# Patient Record
Sex: MALE | Race: WHITE | NOT HISPANIC OR LATINO
[De-identification: names, ages, dates, MRNs, and addresses within clinical notes are randomized per-mention and may not be internally consistent; named-entity substitution may affect disease eponyms.]

---

## 2018-05-01 ENCOUNTER — RECORDS - HEALTHEAST (OUTPATIENT)
Dept: ADMINISTRATIVE | Facility: OTHER | Age: 68
End: 2018-05-01

## 2018-05-07 ENCOUNTER — RECORDS - HEALTHEAST (OUTPATIENT)
Dept: ADMINISTRATIVE | Facility: OTHER | Age: 68
End: 2018-05-07

## 2018-05-09 ASSESSMENT — MIFFLIN-ST. JEOR: SCORE: 1826.74

## 2018-05-16 ASSESSMENT — MIFFLIN-ST. JEOR: SCORE: 1799.52

## 2018-05-18 ENCOUNTER — ANESTHESIA - HEALTHEAST (OUTPATIENT)
Dept: SURGERY | Facility: CLINIC | Age: 68
End: 2018-05-18

## 2018-05-21 ENCOUNTER — SURGERY - HEALTHEAST (OUTPATIENT)
Dept: SURGERY | Facility: CLINIC | Age: 68
End: 2018-05-21

## 2018-05-21 ASSESSMENT — MIFFLIN-ST. JEOR: SCORE: 1800.09

## 2019-01-01 ENCOUNTER — RECORDS - HEALTHEAST (OUTPATIENT)
Dept: ADMINISTRATIVE | Facility: OTHER | Age: 69
End: 2019-01-01

## 2019-09-27 ENCOUNTER — RECORDS - HEALTHEAST (OUTPATIENT)
Dept: ADMINISTRATIVE | Facility: OTHER | Age: 69
End: 2019-09-27

## 2019-09-30 ENCOUNTER — RECORDS - HEALTHEAST (OUTPATIENT)
Dept: ADMINISTRATIVE | Facility: OTHER | Age: 69
End: 2019-09-30

## 2019-10-03 ENCOUNTER — RECORDS - HEALTHEAST (OUTPATIENT)
Dept: ADMINISTRATIVE | Facility: OTHER | Age: 69
End: 2019-10-03

## 2020-01-01 ENCOUNTER — COMMUNICATION - HEALTHEAST (OUTPATIENT)
Dept: SCHEDULING | Facility: CLINIC | Age: 70
End: 2020-01-01

## 2020-01-01 ENCOUNTER — INFUSION - HEALTHEAST (OUTPATIENT)
Dept: INFUSION THERAPY | Facility: HOSPITAL | Age: 70
End: 2020-01-01

## 2020-01-01 ENCOUNTER — HOSPITAL ENCOUNTER (OUTPATIENT)
Dept: CT IMAGING | Facility: HOSPITAL | Age: 70
Setting detail: RADIATION/ONCOLOGY SERIES
Discharge: STILL ADMITTED OR ADMINISTRATIVE | End: 2020-09-30
Attending: INTERNAL MEDICINE

## 2020-01-01 ENCOUNTER — RECORDS - HEALTHEAST (OUTPATIENT)
Dept: ADMINISTRATIVE | Facility: OTHER | Age: 70
End: 2020-01-01

## 2020-01-01 ENCOUNTER — SURGERY - HEALTHEAST (OUTPATIENT)
Dept: SURGERY | Facility: HOSPITAL | Age: 70
End: 2020-01-01

## 2020-01-01 ENCOUNTER — COMMUNICATION - HEALTHEAST (OUTPATIENT)
Dept: ADMINISTRATIVE | Facility: HOSPITAL | Age: 70
End: 2020-01-01

## 2020-01-01 ENCOUNTER — COMMUNICATION - HEALTHEAST (OUTPATIENT)
Dept: ONCOLOGY | Facility: HOSPITAL | Age: 70
End: 2020-01-01

## 2020-01-01 ENCOUNTER — RECORDS - HEALTHEAST (OUTPATIENT)
Dept: RADIOLOGY | Facility: CLINIC | Age: 70
End: 2020-01-01

## 2020-01-01 ENCOUNTER — AMBULATORY - HEALTHEAST (OUTPATIENT)
Dept: FAMILY MEDICINE | Facility: CLINIC | Age: 70
End: 2020-01-01

## 2020-01-01 ENCOUNTER — AMBULATORY - HEALTHEAST (OUTPATIENT)
Dept: INTERVENTIONAL RADIOLOGY/VASCULAR | Facility: HOSPITAL | Age: 70
End: 2020-01-01

## 2020-01-01 ENCOUNTER — ANESTHESIA - HEALTHEAST (OUTPATIENT)
Dept: SURGERY | Facility: HOSPITAL | Age: 70
End: 2020-01-01

## 2020-01-01 ENCOUNTER — AMBULATORY - HEALTHEAST (OUTPATIENT)
Dept: ULTRASOUND IMAGING | Facility: HOSPITAL | Age: 70
End: 2020-01-01

## 2020-01-01 ENCOUNTER — AMBULATORY - HEALTHEAST (OUTPATIENT)
Dept: SCHEDULING | Facility: CLINIC | Age: 70
End: 2020-01-01

## 2020-01-01 ENCOUNTER — OFFICE VISIT - HEALTHEAST (OUTPATIENT)
Dept: ONCOLOGY | Facility: HOSPITAL | Age: 70
End: 2020-01-01

## 2020-01-01 ENCOUNTER — HOSPITAL ENCOUNTER (OUTPATIENT)
Dept: ULTRASOUND IMAGING | Facility: HOSPITAL | Age: 70
Discharge: HOME | End: 2020-09-15
Attending: INTERNAL MEDICINE | Admitting: RADIOLOGY

## 2020-01-01 ENCOUNTER — AMBULATORY - HEALTHEAST (OUTPATIENT)
Dept: INFUSION THERAPY | Facility: HOSPITAL | Age: 70
End: 2020-01-01

## 2020-01-01 ENCOUNTER — HOSPITAL ENCOUNTER (OUTPATIENT)
Dept: INTERVENTIONAL RADIOLOGY/VASCULAR | Facility: HOSPITAL | Age: 70
Setting detail: RADIATION/ONCOLOGY SERIES
Discharge: STILL ADMITTED OR ADMINISTRATIVE | End: 2020-09-14
Attending: INTERNAL MEDICINE

## 2020-01-01 ENCOUNTER — RECORDS - HEALTHEAST (OUTPATIENT)
Dept: HEALTH INFORMATION MANAGEMENT | Facility: CLINIC | Age: 70
End: 2020-01-01

## 2020-01-01 ENCOUNTER — HOSPITAL ENCOUNTER (OUTPATIENT)
Dept: ULTRASOUND IMAGING | Facility: HOSPITAL | Age: 70
Discharge: HOME | End: 2020-09-01
Attending: INTERNAL MEDICINE | Admitting: RADIOLOGY

## 2020-01-01 ENCOUNTER — AMBULATORY - HEALTHEAST (OUTPATIENT)
Dept: ULTRASOUND IMAGING | Facility: CLINIC | Age: 70
End: 2020-01-01

## 2020-01-01 ENCOUNTER — HOSPITAL ENCOUNTER (OUTPATIENT)
Dept: ULTRASOUND IMAGING | Facility: CLINIC | Age: 70
Discharge: HOME | End: 2020-08-14
Attending: INTERNAL MEDICINE | Admitting: RADIOLOGY

## 2020-01-01 DIAGNOSIS — C19 COLORECTAL CANCER (H): ICD-10-CM

## 2020-01-01 DIAGNOSIS — E86.0 DEHYDRATION: ICD-10-CM

## 2020-01-01 DIAGNOSIS — C18.7 CANCER OF SIGMOID COLON (H): ICD-10-CM

## 2020-01-01 DIAGNOSIS — Z51.11 ENCOUNTER FOR ANTINEOPLASTIC CHEMOTHERAPY: ICD-10-CM

## 2020-01-01 DIAGNOSIS — Z11.59 ENCOUNTER FOR SCREENING FOR OTHER VIRAL DISEASES: ICD-10-CM

## 2020-01-01 DIAGNOSIS — Z96.641 STATUS POST RIGHT HIP REPLACEMENT: ICD-10-CM

## 2020-01-01 DIAGNOSIS — R06.02 SHORTNESS OF BREATH: ICD-10-CM

## 2020-01-01 DIAGNOSIS — R11.2 CHEMOTHERAPY INDUCED NAUSEA AND VOMITING: ICD-10-CM

## 2020-01-01 DIAGNOSIS — E87.6 HYPOKALEMIA: ICD-10-CM

## 2020-01-01 DIAGNOSIS — T45.1X5A CHEMOTHERAPY INDUCED NAUSEA AND VOMITING: ICD-10-CM

## 2020-01-01 DIAGNOSIS — R18.0 MALIGNANT ASCITES (H): ICD-10-CM

## 2020-01-01 LAB
ALBUMIN SERPL-MCNC: 2.5 G/DL (ref 3.5–5)
ALBUMIN SERPL-MCNC: 2.6 G/DL (ref 3.5–5)
ALBUMIN SERPL-MCNC: 2.6 G/DL (ref 3.5–5)
ALBUMIN UR-MCNC: ABNORMAL MG/DL
ALBUMIN UR-MCNC: ABNORMAL MG/DL
ALP SERPL-CCNC: 51 U/L (ref 45–120)
ALP SERPL-CCNC: 82 U/L (ref 45–120)
ALP SERPL-CCNC: 82 U/L (ref 45–120)
ALT SERPL W P-5'-P-CCNC: 16 U/L (ref 0–45)
ALT SERPL W P-5'-P-CCNC: 21 U/L (ref 0–45)
ALT SERPL W P-5'-P-CCNC: 24 U/L (ref 0–45)
ALT SERPL W/O P-5'-P-CCNC: 13 U/L (ref 9–46)
ANION GAP SERPL CALCULATED.3IONS-SCNC: 10 MMOL/L (ref 5–18)
ANION GAP SERPL CALCULATED.3IONS-SCNC: 12 MMOL/L (ref 5–18)
ANION GAP SERPL CALCULATED.3IONS-SCNC: 16 MMOL/L (ref 5–18)
AST SERPL W P-5'-P-CCNC: 22 U/L (ref 0–40)
AST SERPL W P-5'-P-CCNC: 23 U/L (ref 0–40)
AST SERPL W P-5'-P-CCNC: 30 U/L (ref 0–40)
AST SERPL-CCNC: 28 U/L (ref 10–35)
BASOPHILS # BLD AUTO: 0 THOU/UL (ref 0–0.2)
BASOPHILS # BLD AUTO: 0 THOU/UL (ref 0–0.2)
BASOPHILS # BLD AUTO: 0.1 THOU/UL (ref 0–0.2)
BASOPHILS NFR BLD AUTO: 0 % (ref 0–2)
BASOPHILS NFR BLD AUTO: 1 % (ref 0–2)
BASOPHILS NFR BLD AUTO: 1 % (ref 0–2)
BILIRUB SERPL-MCNC: 0.3 MG/DL (ref 0–1)
BILIRUB SERPL-MCNC: 0.6 MG/DL (ref 0–1)
BILIRUB SERPL-MCNC: 0.6 MG/DL (ref 0–1)
BUN SERPL-MCNC: 22 MG/DL (ref 8–28)
BUN SERPL-MCNC: 24 MG/DL (ref 8–28)
BUN SERPL-MCNC: 29 MG/DL (ref 8–28)
CALCIUM SERPL-MCNC: 8.4 MG/DL (ref 8.5–10.5)
CALCIUM SERPL-MCNC: 8.8 MG/DL (ref 8.5–10.5)
CALCIUM SERPL-MCNC: 8.9 MG/DL (ref 8.5–10.5)
CAP COMMENT: ABNORMAL
CAP COMMENT: NORMAL
CEA SERPL-MCNC: 1157.4 NG/ML (ref 0–3)
CHLORIDE BLD-SCNC: 100 MMOL/L (ref 98–107)
CHLORIDE BLD-SCNC: 93 MMOL/L (ref 98–107)
CHLORIDE BLD-SCNC: 94 MMOL/L (ref 98–107)
CHOLEST SERPL-MCNC: 156 MG/DL
CO2 SERPL-SCNC: 18 MMOL/L (ref 22–31)
CO2 SERPL-SCNC: 20 MMOL/L (ref 22–31)
CO2 SERPL-SCNC: 29 MMOL/L (ref 22–31)
CREAT SERPL-MCNC: 0.95 MG/DL (ref 0.7–1.3)
CREAT SERPL-MCNC: 1.3 MG/DL (ref 0.7–1.3)
CREAT SERPL-MCNC: 1.43 MG/DL (ref 0.7–1.15)
CREAT SERPL-MCNC: 1.47 MG/DL (ref 0.7–1.3)
DOHLE BODIES: ABNORMAL
EOSINOPHIL # BLD AUTO: 0 THOU/UL (ref 0–0.4)
EOSINOPHIL # BLD AUTO: 0.1 THOU/UL (ref 0–0.4)
EOSINOPHIL COUNT (ABSOLUTE): 0 THOU/UL (ref 0–0.4)
EOSINOPHIL NFR BLD AUTO: 0 % (ref 0–6)
EOSINOPHIL NFR BLD AUTO: 0 % (ref 0–6)
EOSINOPHIL NFR BLD AUTO: 2 % (ref 0–6)
ERYTHROCYTE [DISTWIDTH] IN BLOOD BY AUTOMATED COUNT: 15.2 % (ref 11–14.5)
ERYTHROCYTE [DISTWIDTH] IN BLOOD BY AUTOMATED COUNT: 15.7 % (ref 11–14.5)
ERYTHROCYTE [DISTWIDTH] IN BLOOD BY AUTOMATED COUNT: 15.8 % (ref 11–14.5)
GENETICIST REVIEW: NORMAL
GFR ESTIMATE EXT - HISTORICAL: 49 ML/MIN/1.73M2
GFR ESTIMATE, IF BLACK EXT - HISTORICAL: 56 ML/MIN/1.73M2
GFR SERPL CREATININE-BSD FRML MDRD: 47 ML/MIN/1.73M2
GFR SERPL CREATININE-BSD FRML MDRD: 54 ML/MIN/1.73M2
GFR SERPL CREATININE-BSD FRML MDRD: >60 ML/MIN/1.73M2
GLUCOSE BLD-MCNC: 101 MG/DL (ref 70–125)
GLUCOSE BLD-MCNC: 105 MG/DL (ref 70–125)
GLUCOSE BLD-MCNC: 110 MG/DL (ref 70–125)
HCT VFR BLD AUTO: 37.9 % (ref 40–54)
HCT VFR BLD AUTO: 39.6 % (ref 40–54)
HCT VFR BLD AUTO: 42 % (ref 40–54)
HDLC SERPL-MCNC: 29 MG/DL
HGB BLD-MCNC: 12.9 G/DL (ref 14–18)
HGB BLD-MCNC: 13.2 G/DL (ref 14–18)
HGB BLD-MCNC: 14.5 G/DL (ref 14–18)
IMM GRANULOCYTES # BLD: 0 THOU/UL
IMM GRANULOCYTES # BLD: 0.1 THOU/UL
IMM GRANULOCYTES NFR BLD: 1 %
IMM GRANULOCYTES NFR BLD: 1 %
IMMATURE GRANULOCYTE % - MAN (DIFF): 2 %
IMMATURE GRANULOCYTE ABSOLUTE - MAN (DIFF): 0.2 THOU/UL
LAB AP CHARGES (HE HISTORICAL CONVERSION): ABNORMAL
LAB AP CHARGES (HE HISTORICAL CONVERSION): NORMAL
LAB MED GENERAL PATH INTERP (HE HISTORICAL CONVERSION): ABNORMAL
LAB MED GENERAL PATH INTERP (HE HISTORICAL CONVERSION): NORMAL
LDLC SERPL CALC-MCNC: 107 MG/DL
LYMPHOCYTES # BLD AUTO: 1 THOU/UL (ref 0.8–4.4)
LYMPHOCYTES # BLD AUTO: 1.5 THOU/UL (ref 0.8–4.4)
LYMPHOCYTES # BLD AUTO: 1.5 THOU/UL (ref 0.8–4.4)
LYMPHOCYTES NFR BLD AUTO: 15 % (ref 20–40)
LYMPHOCYTES NFR BLD AUTO: 17 % (ref 20–40)
LYMPHOCYTES NFR BLD AUTO: 17 % (ref 20–40)
MAGNESIUM SERPL-MCNC: 1.7 MG/DL (ref 1.8–2.6)
MCH RBC QN AUTO: 27.8 PG (ref 27–34)
MCH RBC QN AUTO: 27.9 PG (ref 27–34)
MCH RBC QN AUTO: 28 PG (ref 27–34)
MCHC RBC AUTO-ENTMCNC: 33.3 G/DL (ref 32–36)
MCHC RBC AUTO-ENTMCNC: 34 G/DL (ref 32–36)
MCHC RBC AUTO-ENTMCNC: 34.5 G/DL (ref 32–36)
MCV RBC AUTO: 81 FL (ref 80–100)
MCV RBC AUTO: 82 FL (ref 80–100)
MCV RBC AUTO: 84 FL (ref 80–100)
METAMYELOCYTES (ABSOLUTE): 0.2 THOU/UL
METAMYELOCYTES NFR BLD MANUAL: 2 %
MONOCYTES # BLD AUTO: 0.5 THOU/UL (ref 0–0.9)
MONOCYTES # BLD AUTO: 0.6 THOU/UL (ref 0–0.9)
MONOCYTES # BLD AUTO: 0.6 THOU/UL (ref 0–0.9)
MONOCYTES NFR BLD AUTO: 5 % (ref 2–10)
MONOCYTES NFR BLD AUTO: 7 % (ref 2–10)
MONOCYTES NFR BLD AUTO: 9 % (ref 2–10)
NEUTROPHILS # BLD AUTO: 4.9 THOU/UL (ref 2–7.7)
NEUTROPHILS # BLD AUTO: 6.4 THOU/UL (ref 2–7.7)
NEUTROPHILS NFR BLD AUTO: 74 % (ref 50–70)
NEUTROPHILS NFR BLD AUTO: 76 % (ref 50–70)
NON HDL CHOL. (LDL+VLDL): 127 MG/DL
OVALOCYTES: ABNORMAL
OVALOCYTES: ABNORMAL
PATH REPORT.COMMENTS IMP SPEC: ABNORMAL
PATH REPORT.COMMENTS IMP SPEC: ABNORMAL
PATH REPORT.COMMENTS IMP SPEC: NORMAL
PATH REPORT.FINAL DX SPEC: ABNORMAL
PATH REPORT.FINAL DX SPEC: NORMAL
PATH REPORT.MICROSCOPIC SPEC OTHER STN: ABNORMAL
PATH REPORT.MICROSCOPIC SPEC OTHER STN: NORMAL
PATH REPORT.RELEVANT HX SPEC: ABNORMAL
PATH REPORT.RELEVANT HX SPEC: NORMAL
PLAT MORPH BLD: NORMAL
PLAT MORPH BLD: NORMAL
PLATELET # BLD AUTO: 300 THOU/UL (ref 140–440)
PLATELET # BLD AUTO: 309 THOU/UL (ref 140–440)
PLATELET # BLD AUTO: 354 THOU/UL (ref 140–440)
PMV BLD AUTO: 10.1 FL (ref 8.5–12.5)
PMV BLD AUTO: 10.2 FL (ref 8.5–12.5)
PMV BLD AUTO: 9.5 FL (ref 8.5–12.5)
POLYCHROMASIA BLD QL SMEAR: ABNORMAL
POTASSIUM BLD-SCNC: 3.1 MMOL/L (ref 3.5–5)
POTASSIUM BLD-SCNC: 3.5 MMOL/L (ref 3.5–5)
POTASSIUM BLD-SCNC: 4.2 MMOL/L (ref 3.5–5)
PROT SERPL-MCNC: 6.1 G/DL (ref 6–8)
PROT SERPL-MCNC: 6.2 G/DL (ref 6–8)
PROT SERPL-MCNC: 6.3 G/DL (ref 6–8)
PROVIDER SIGNING NAME: NORMAL
RBC # BLD AUTO: 4.64 MILL/UL (ref 4.4–6.2)
RBC # BLD AUTO: 4.71 MILL/UL (ref 4.4–6.2)
RBC # BLD AUTO: 5.19 MILL/UL (ref 4.4–6.2)
REACTIVE LYMPHS: ABNORMAL
REF LAB TEST METHOD: NORMAL
SARS-COV-2 PCR COMMENT: NORMAL
SARS-COV-2 RNA SPEC QL NAA+PROBE: NEGATIVE
SARS-COV-2 VIRUS SPECIMEN SOURCE: NORMAL
SODIUM SERPL-SCNC: 128 MMOL/L (ref 136–145)
SODIUM SERPL-SCNC: 130 MMOL/L (ref 136–145)
SODIUM SERPL-SCNC: 134 MMOL/L (ref 136–145)
SPECIMEN DESCRIPTION: ABNORMAL
SPECIMEN DESCRIPTION: NORMAL
TEST PERFORMANCE INFO SPEC: NORMAL
TOTAL NEUTROPHILS-ABS(DIFF): 6.3 THOU/UL (ref 2–7.7)
TOTAL NEUTROPHILS-REL(DIFF): 73 % (ref 50–70)
TOXIC GRANULATION: ABNORMAL
TRIGLYCERIDES (HISTORICAL CONVERSION): 100 MG/DL
UGT1A1 ALLELE GENO BLD/T: NORMAL
UGT1A1 GENE PROD MET ACT IMP BLD/T-IMP: NORMAL
WBC: 6.6 THOU/UL (ref 4–11)
WBC: 8.5 THOU/UL (ref 4–11)
WBC: 8.6 THOU/UL (ref 4–11)

## 2020-01-01 RX ORDER — PROCHLORPERAZINE MALEATE 10 MG
10 TABLET ORAL EVERY 6 HOURS PRN
Qty: 30 TABLET | Refills: 1 | Status: SHIPPED | OUTPATIENT
Start: 2020-01-01

## 2020-01-01 RX ORDER — LOPERAMIDE HCL 2 MG
CAPSULE ORAL
Qty: 30 CAPSULE | Refills: 0 | Status: SHIPPED | OUTPATIENT
Start: 2020-01-01

## 2020-01-01 RX ORDER — ONDANSETRON 8 MG/1
8 TABLET, ORALLY DISINTEGRATING ORAL EVERY 6 HOURS PRN
Qty: 30 TABLET | Refills: 1 | Status: SHIPPED | OUTPATIENT
Start: 2020-01-01

## 2020-01-01 RX ORDER — LANOLIN ALCOHOL/MO/W.PET/CERES
3 CREAM (GRAM) TOPICAL
Status: SHIPPED | COMMUNITY
Start: 2020-01-01

## 2020-01-01 RX ORDER — LIDOCAINE/PRILOCAINE 2.5 %-2.5%
CREAM (GRAM) TOPICAL
Qty: 15 G | Refills: 1 | Status: SHIPPED | OUTPATIENT
Start: 2020-01-01

## 2020-01-01 ASSESSMENT — MIFFLIN-ST. JEOR
SCORE: 1615.38
SCORE: 1670.25
SCORE: 1651.38
SCORE: 1669.8

## 2020-10-22 ENCOUNTER — RECORDS - HEALTHEAST (OUTPATIENT)
Dept: ADMINISTRATIVE | Facility: OTHER | Age: 70
End: 2020-10-22

## 2021-05-26 VITALS — HEART RATE: 100 BPM | OXYGEN SATURATION: 95 % | SYSTOLIC BLOOD PRESSURE: 136 MMHG | DIASTOLIC BLOOD PRESSURE: 78 MMHG

## 2021-05-26 VITALS — OXYGEN SATURATION: 98 % | SYSTOLIC BLOOD PRESSURE: 124 MMHG | DIASTOLIC BLOOD PRESSURE: 70 MMHG | HEART RATE: 78 BPM

## 2021-05-26 VITALS
DIASTOLIC BLOOD PRESSURE: 78 MMHG | OXYGEN SATURATION: 99 % | HEART RATE: 86 BPM | TEMPERATURE: 97.6 F | SYSTOLIC BLOOD PRESSURE: 123 MMHG

## 2021-05-26 VITALS
SYSTOLIC BLOOD PRESSURE: 144 MMHG | TEMPERATURE: 97.7 F | DIASTOLIC BLOOD PRESSURE: 87 MMHG | OXYGEN SATURATION: 96 % | HEART RATE: 71 BPM

## 2021-05-26 VITALS — SYSTOLIC BLOOD PRESSURE: 121 MMHG | TEMPERATURE: 97.4 F | DIASTOLIC BLOOD PRESSURE: 65 MMHG | HEART RATE: 88 BPM

## 2021-05-27 VITALS
TEMPERATURE: 97.5 F | HEART RATE: 101 BPM | DIASTOLIC BLOOD PRESSURE: 65 MMHG | OXYGEN SATURATION: 97 % | SYSTOLIC BLOOD PRESSURE: 101 MMHG

## 2021-06-01 VITALS — BODY MASS INDEX: 32.95 KG/M2 | WEIGHT: 230.13 LBS | HEIGHT: 70 IN

## 2021-06-05 VITALS — BODY MASS INDEX: 30.36 KG/M2 | WEIGHT: 205 LBS | HEIGHT: 69 IN

## 2021-06-05 VITALS
DIASTOLIC BLOOD PRESSURE: 80 MMHG | TEMPERATURE: 98 F | SYSTOLIC BLOOD PRESSURE: 125 MMHG | OXYGEN SATURATION: 96 % | WEIGHT: 204.9 LBS | HEIGHT: 69 IN | HEART RATE: 92 BPM | BODY MASS INDEX: 30.35 KG/M2

## 2021-06-05 VITALS
BODY MASS INDEX: 27.3 KG/M2 | DIASTOLIC BLOOD PRESSURE: 75 MMHG | OXYGEN SATURATION: 98 % | WEIGHT: 184.9 LBS | SYSTOLIC BLOOD PRESSURE: 117 MMHG | HEART RATE: 91 BPM | TEMPERATURE: 97.4 F

## 2021-06-05 VITALS — WEIGHT: 200.84 LBS | HEIGHT: 69 IN | BODY MASS INDEX: 29.75 KG/M2

## 2021-06-10 NOTE — TELEPHONE ENCOUNTER
Patient's questioins regarding MARLENE were addressed. He will drop it off at [facility] on 8/27/20 between Noon and 1 pm.

## 2021-06-10 NOTE — TELEPHONE ENCOUNTER
Navigator called patient to relay that Dr. Osullivan gave approval for him to record his consult so that he may share it with his family members who cannot attend. Pt verbalized gratitude.   He states he delivered MARLENE to medical records staff at  and assisted in completing history.     Expresses appreciation for staff interactions.

## 2021-06-10 NOTE — TELEPHONE ENCOUNTER
..New Patient Oncology Nurse Navigator Note     Referring provider: Self for 2nd opinion     Referring Clinic/Organization:      Referred to (specialty): Med Onc    Requested provider (if applicable): Abran: States he would like to transfer care to Dr. Osullivan     Date Referral Received: 8/24/2020     Evaluation for : Metastatic Colon     Clinical History (per Nurse review of records provided):    Surgery at [facility] 10/22/2019:  Contreras Guillermo MD - 10/22/2019 3:44 PM CDT    POSTOPERATIVE / POSTPROCEDURE NOTE - IMMEDIATE :  Surgeon(s)/Proceduralist(s) and Assistants (if any):  Surgeon(s):  Contreras Guillermo MD Williams, Frank Delgado MD    Pre-operative/Pre-procedural Diagnosis:  Malignant neoplasm of sigmoid colon  Procedure(s):  ERP: Robotic assisted converted to open sigmoid colectomy (N/A)  Procedure(s) findings:   Very large distal sigmoid cancer adherent of back wall of bladder and right pelvic sidewall  Specimen(s) removed:   ID Type Source Tests Collected by Time Destination   1 : rectosigmoid colon Tissue Rectosigmoid Colon, Partial PATH TISSUE EXAM Contreras Guillermo MD 10/22/2019 1419     Postoperative/Postprocedure Diagnosis:   Malignant neoplasm of sigmoid colon  Contreras Guillermo MD      PATH TISSUE EXAM Resulted: 10/25/2019 1:51 PM  [facility]  Final Diagnosis A) COLON, ROBOTIC CONVERTED TO OPEN SIGMOID COLECTOMY:  1. Mucinous adenocarcinoma, characterized by:     a.  Tumor location: Sigmoid, 4.5 cm from the distal margin     b.  Tumor size: 10 x 8 x 1.5 cm     c.  Tumor extent: Adenocarcinoma invades to involve the visceral serosa  2. DNA mismatch repair enzyme immunohistochemistry is intact (block A4)  3. The adenocarcinoma is associated with an overlying tubulovillous adenoma  4. A separate tubular villous adenoma is also identified     a.  Polyp size: 2 x 1.5 x 1.5 cm     b.  Polyp location: Sigmoid, 0.7 cm proximal to the mass  5. An  inflammatory polyp with underlying abscess (possibly diverticulitis)     is also identified  6. The margins of resection are negative for malignancy  7. Multiple (17) regional lymph nodes identified are negative for malignancy  8. See synoptic form     Comment ** Ancillary Testing Comment **  Immunohistochemistry reveals intact nuclear staining for DNA mismatch repair enzymes MLH1, MSH2, MSH6 and PMS2. These results fail to show evidence for defective mismatch repair function or Aranda Syndrome and are approximately 85-90% sensitive for Aranda syndrome. Cancers with this profile are usually microsatellite stable (DEIDRA). If Aranda syndrome is strongly suspected clinically please contact us ([facility] [phone]). Rarely there can be a functionally significant mutation that leaves the protein intact or other heritable reasons for this finding.     Clinical Information 69-year-old male patient who presents with a large lesion of the rectosigmoid junction which appears to be a cancer. The patient has local lymphadenopathy but no visceral invasion. No distant disease by imaging.         Seen by [facility].   Paracentesis ordered by Venkat Potter MD:    8/14/2020 Ultrasound Paracentesis [facility]:  US Paracentesis [[id number]]  Collected: 08/14/20 1311    Order Status: Completed  Updated: 08/14/20 1322    Narrative:      EXAM:   1. PARACENTESIS   2. ULTRASOUND GUIDANCE   LOCATION: [facility]   DATE/TIME: 8/14/2020 1:11 PM     INDICATION: Ascites.     PROCEDURE: Informed consent obtained. Time out performed. The abdomen was prepped and draped in a sterile fashion. 8  mL of 1 percent lidocaine was infused into local soft tissues. A 5 [place] catheter system was introduced into the abdominal ascites   under ultrasound guidance.     7.7  liters of clear fluid was removed and sent to lab if requested.     Patient tolerated procedure well.     Ultrasound imaging was obtained and placed in the patient's  permanent medical record.    Impression:      1.  Status post ultrasound-guided paracentesis.      Contains abnormal data  Medical cytology: [id number]   Order: [id number]   Collected:  8/14/2020 13:13 Status:  Final result   Visible to patient:  Yes (Yeyot)    Ref Range & Units   Resulting Agency    Final Diagnosis    PERITONEAL FLUID, ASPIRATION WITH CELL BLOCK PREPARATION:      - RARE MALIGNANT CELLS CONSISTENT WITH HISTORY OF MUCINOUS ADENOCARCINOMA ([id number],            [facility])     [facility]    Electronically signed by Jimmie Ratliff MD on 8/19/2020 at 1025    Comment    [facility]    The clinical history has been reviewed. Cytology and histology demonstrates rare cytologically malignant mucin producing tumor cells. These tumor cells are positive for CK20, CDX-2 and possibly p53. The proliferation rate by Ki-67 overall is less than 5%. CK7 and calretinin also highlight rare atypical cells. Consideration for additional sampling or outside consultation is suggested. Clinical correlation is suggested.              Dr. Potter, [facility], recommended pt have new biopsy at [place] because the original one was done there.  PET scan was done at [facility] - no date given.    Per WQ inbasket: Seeking 2nd opinion from Dr. Osullivan.  Currently patient at [facility]/[place].  Surgery done at [facility]. In October 2019. Patient has had scans there, too.  Initially, the tumor was encapsulated and attached to the wall of the colon.  Patient has biopsy scheduled at [place] on 8/31/20. Paracentesis scheduled at , (ordered by [facility] provider) on 9/1/20.       Clinical Assessment / Barriers to Care (Per Nurse): Patient and wife are articulate about setting appointment and work up that is going on. He indicates he is hoping to transfer care to Dr. Osullivan: no reason given.       Records Location (Care Everywhere, Media, etc.): Care Everywhere. MARLENE emailed to pt.       [facility], [facility]     Additional testing needed prior to consult: per oncologist

## 2021-06-11 NOTE — PROCEDURES
[facility]    Procedure: IR Procedure Note    Date/Time: 9/14/2020 2:17 PM  Performed by: Killian Kulkarni MD  Authorized by: Killian Kulkarni MD       Universal Protocol    Site marked: Yes    Prior images obtained and reviewed: Yes    Required items: required blood products, implants, devices, and special equipment available    Patient identity confirmed: verbally with patient    Reevaluation: Patient was reevaluated immediately before administering moderate or deep sedation or anesthesia    Confirmation checklist: patient's identity using two indicators, relevant allergies, procedure was appropriate and matched the consent or emergent situation and correct equipment/implants were available    Time out: Immediately prior to procedure a time out was called to verify the correct patient, procedure, equipment, support staff and site/side marked as required    Universal Protocol: Joint Commission Universal Protocol was followed    Preparation: Patient was prepped and draped in the usual sterile fashion    Anesthesia    Local anesthesia used?: Yes    Sedation    Patient sedation: Yes    Vital signs: Vital signs monitored during sedation  Specimens: none  Complications: None  Condition: Stable    Post-procedure    Description of procedure: SL port right IJ, tip at cavoatrial junction    Patient tolerance: Patient tolerated the procedure well with no immediate complications   Length of time physician present for 1:1 monitoring during sedation: 30

## 2021-06-11 NOTE — PATIENT INSTRUCTIONS - HE
Patient Education   Patient Education   Patient Education   Patient Education     Bevacizumab Solution for injection  What is this medicine?  BEVACIZUMAB (be va SIZ loy mab) is a chemotherapy drug. It targets a protein found in many cancer cell types, and halts cancer growth. This drug treats many cancers including non-small cell lung cancer, ovarian cancer, cervical cancer, and colon or rectal cancer. It is usually given with other chemotherapy drugs.  This medicine may be used for other purposes; ask your health care provider or pharmacist if you have questions.  What should I tell my health care provider before I take this medicine?  They need to know if you have any of these conditions:    blood clots    heart disease, including heart failure, heart attack, or chest pain (angina)    high blood pressure    infection (especially a virus infection such as chickenpox, cold sores, or herpes)    kidney disease    lung disease    prior chemotherapy with doxorubicin, daunorubicin, epirubicin, or other anthracycline type chemotherapy agents    recent or ongoing radiation therapy    recent surgery    stroke    an unusual or allergic reaction to bevacizumab, hamster proteins, mouse proteins, other medicines, foods, dyes, or preservatives    pregnant or trying to get pregnant    breast-feeding  How should I use this medicine?  This medicine is for infusion into a vein. It is given by a health care professional in a hospital or clinic setting.  Talk to your pediatrician regarding the use of this medicine in children. Special care may be needed.  Overdosage: If you think you have taken too much of this medicine contact a poison control center or emergency room at once.  NOTE: This medicine is only for you. Do not share this medicine with others.  What if I miss a dose?  It is important not to miss your dose. Call your doctor or health care professional if you are unable to keep an appointment.  What may interact with this  medicine?  Interactions are not expected.  This list may not describe all possible interactions. Give your health care provider a list of all the medicines, herbs, non-prescription drugs, or dietary supplements you use. Also tell them if you smoke, drink alcohol, or use illegal drugs. Some items may interact with your medicine.  What should I watch for while using this medicine?  Your condition will be monitored carefully while you are receiving this medicine. You will need important blood work and urine testing done while you are taking this medicine.  During your treatment, let your health care professional know if you have any unusual symptoms, such as difficulty breathing.  This medicine may rarely cause 'gastrointestinal perforation' (holes in the stomach, intestines or colon), a serious side effect requiring surgery to repair.  This medicine should be started at least 28 days following major surgery and the site of the surgery should be totally healed. Check with your doctor before scheduling dental work or surgery while you are receiving this treatment. Talk to your doctor if you have recently had surgery or if you have a wound that has not healed.  Do not become pregnant while taking this medicine. Women should inform their doctor if they wish to become pregnant or think they might be pregnant. There is a potential for serious side effects to an unborn child. Talk to your health care professional or pharmacist for more information. Do not breast-feed an infant while taking this medicine.  This medicine has caused ovarian failure in some women. This medicine may interfere with the ability to have a child. You should talk to your doctor or health care professional if you are concerned about your fertility.  What side effects may I notice from receiving this medicine?  Side effects that you should report to your doctor or health care professional as soon as possible:    allergic reactions like skin rash,  itching or hives, swelling of the face, lips, or tongue    signs of infection - fever or chills, cough, sore throat, pain or trouble passing urine    signs of decreased platelets or bleeding - bruising, pinpoint red spots on the skin, black, tarry stools, nosebleeds, blood in the urine    breathing problems    changes in vision    chest pain    confusion    jaw pain, especially after dental work    mouth sores    seizures    severe abdominal pain    severe headache    sudden numbness or weakness of the face, arm or leg    swelling of legs or ankles    symptoms of a stroke: change in mental awareness, inability to talk or move one side of the body (especially in patients with lung cancer)    trouble passing urine or change in the amount of urine    trouble speaking or understanding    trouble walking, dizziness, loss of balance or coordination  Side effects that usually do not require medical attention (report to your doctor or health care professional if they continue or are bothersome):    constipation    diarrhea    dry skin    headache    loss of appetite    nausea, vomiting  This list may not describe all possible side effects. Call your doctor for medical advice about side effects. You may report side effects to FDA at 9-281-FDA-4986.  Where should I keep my medicine?  This drug is given in a hospital or clinic and will not be stored at home.  NOTE:This sheet is a summary. It may not cover all possible information. If you have questions about this medicine, talk to your doctor, pharmacist, or health care provider. Copyright  2015 [employer] Standard           Oxaliplatin Solution for injection  What is this medicine?  OXALIPLATIN (ox AL i JIM tin) is a chemotherapy drug. It targets fast dividing cells, like cancer cells, and causes these cells to die. This medicine is used to treat cancers of the colon and rectum, and many other cancers.  This medicine may be used for other purposes; ask your health care provider or  pharmacist if you have questions.  What should I tell my health care provider before I take this medicine?  They need to know if you have any of these conditions:    kidney disease    an unusual or allergic reaction to oxaliplatin, other chemotherapy, other medicines, foods, dyes, or preservatives    pregnant or trying to get pregnant    breast-feeding  How should I use this medicine?  This drug is given as an infusion into a vein. It is administered in a hospital or clinic by a specially trained health care professional.  Talk to your pediatrician regarding the use of this medicine in children. Special care may be needed.  Overdosage: If you think you have taken too much of this medicine contact a poison control center or emergency room at once.  NOTE: This medicine is only for you. Do not share this medicine with others.  What if I miss a dose?  It is important not to miss a dose. Call your doctor or health care professional if you are unable to keep an appointment.  What may interact with this medicine?    medicines to increase blood counts like filgrastim, pegfilgrastim, sargramostim    probenecid    some antibiotics like amikacin, gentamicin, neomycin, polymyxin B, streptomycin, tobramycin    zalcitabine  Talk to your doctor or health care professional before taking any of these medicines:    acetaminophen    aspirin    ibuprofen    ketoprofen    naproxen  This list may not describe all possible interactions. Give your health care provider a list of all the medicines, herbs, non-prescription drugs, or dietary supplements you use. Also tell them if you smoke, drink alcohol, or use illegal drugs. Some items may interact with your medicine.  What should I watch for while using this medicine?  Your condition will be monitored carefully while you are receiving this medicine. You will need important blood work done while you are taking this medicine.  This medicine can make you more sensitive to cold. Do not drink  cold drinks or use ice. Cover exposed skin before coming in contact with cold temperatures or cold objects. When out in cold weather wear warm clothing and cover your mouth and nose to warm the air that goes into your lungs. Tell your doctor if you get sensitive to the cold.  This drug may make you feel generally unwell. This is not uncommon, as chemotherapy can affect healthy cells as well as cancer cells. Report any side effects. Continue your course of treatment even though you feel ill unless your doctor tells you to stop.  In some cases, you may be given additional medicines to help with side effects. Follow all directions for their use.  Call your doctor or health care professional for advice if you get a fever, chills or sore throat, or other symptoms of a cold or flu. Do not treat yourself. This drug decreases your body's ability to fight infections. Try to avoid being around people who are sick.  This medicine may increase your risk to bruise or bleed. Call your doctor or health care professional if you notice any unusual bleeding.  Be careful brushing and flossing your teeth or using a toothpick because you may get an infection or bleed more easily. If you have any dental work done, tell your dentist you are receiving this medicine.  Avoid taking products that contain aspirin, acetaminophen, ibuprofen, naproxen, or ketoprofen unless instructed by your doctor. These medicines may hide a fever.  Do not become pregnant while taking this medicine. Women should inform their doctor if they wish to become pregnant or think they might be pregnant. There is a potential for serious side effects to an unborn child. Talk to your health care professional or pharmacist for more information. Do not breast-feed an infant while taking this medicine.  Call your doctor or health care professional if you get diarrhea. Do not treat yourself.  What side effects may I notice from receiving this medicine?  Side effects that you  should report to your doctor or health care professional as soon as possible:    allergic reactions like skin rash, itching or hives, swelling of the face, lips, or tongue    low blood counts - This drug may decrease the number of white blood cells, red blood cells and platelets. You may be at increased risk for infections and bleeding.    signs of infection - fever or chills, cough, sore throat, pain or difficulty passing urine    signs of decreased platelets or bleeding - bruising, pinpoint red spots on the skin, black, tarry stools, nosebleeds    signs of decreased red blood cells - unusually weak or tired, fainting spells, lightheadedness    breathing problems    chest pain, pressure    cough    diarrhea    jaw tightness    mouth sores    nausea and vomiting    pain, swelling, redness or irritation at the injection site    pain, tingling, numbness in the hands or feet    problems with balance, talking, walking    redness, blistering, peeling or loosening of the skin, including inside the mouth    trouble passing urine or change in the amount of urine  Side effects that usually do not require medical attention (report to your doctor or health care professional if they continue or are bothersome):    changes in vision    constipation    hair loss    loss of appetite    metallic taste in the mouth or changes in taste    stomach pain  This list may not describe all possible side effects. Call your doctor for medical advice about side effects. You may report side effects to FDA at 4-038-FDA-5901.  Where should I keep my medicine?  This drug is given in a hospital or clinic and will not be stored at home.  NOTE:This sheet is a summary. It may not cover all possible information. If you have questions about this medicine, talk to your doctor, pharmacist, or health care provider. Copyright  2015 Gold Standard           Irinotecan Hydrochloride Solution for injection  What is this medicine?  IRINOTECAN (ir in [facility] )  is a chemotherapy drug. It is used to treat colon and rectal cancer.  This medicine may be used for other purposes; ask your health care provider or pharmacist if you have questions.  What should I tell my health care provider before I take this medicine?  They need to know if you have any of these conditions:    blood disorders    dehydration    diarrhea    infection (especially a virus infection such as chickenpox, cold sores, or herpes)    liver disease    low blood counts, like low white cell, platelet, or red cell counts    recent or ongoing radiation therapy    an unusual or allergic reaction to irinotecan, sorbitol, other chemotherapy, other medicines, foods, dyes, or preservatives    pregnant or trying to get pregnant    breast-feeding  How should I use this medicine?  This drug is given as an infusion into a vein. It is administered in a hospital or clinic by a specially trained health care professional.  Talk to your pediatrician regarding the use of this medicine in children. Special care may be needed.  Overdosage: If you think you have taken too much of this medicine contact a poison control center or emergency room at once.  NOTE: This medicine is only for you. Do not share this medicine with others.  What if I miss a dose?  It is important not to miss your dose. Call your doctor or health care professional if you are unable to keep an appointment.  What may interact with this medicine?  Do not take this medicine with any of the following medications:    atazanavir    certain medicines for fungal infections like itraconazole and ketoconazole    Lydia's Wort  This medicine may also interact with the following medications:    dexamethasone    diuretics    laxatives    medicines for seizures like carbamazepine, mephobarbital, phenobarbital, phenytoin, primidone    medicines to increase blood counts like filgrastim, pegfilgrastim, sargramostim    prochlorperazine    vaccines  This list may not describe  all possible interactions. Give your health care provider a list of all the medicines, herbs, non-prescription drugs, or dietary supplements you use. Also tell them if you smoke, drink alcohol, or use illegal drugs. Some items may interact with your medicine.  What should I watch for while using this medicine?  Your condition will be monitored carefully while you are receiving this medicine. You will need important blood work done while you are taking this medicine.  This drug may make you feel generally unwell. This is not uncommon, as chemotherapy can affect healthy cells as well as cancer cells. Report any side effects. Continue your course of treatment even though you feel ill unless your doctor tells you to stop.  In some cases, you may be given additional medicines to help with side effects. Follow all directions for their use.  You may get drowsy or dizzy. Do not drive, use machinery, or do anything that needs mental alertness until you know how this medicine affects you. Do not stand or sit up quickly, especially if you are an older patient. This reduces the risk of dizzy or fainting spells.  Call your doctor or health care professional for advice if you get a fever, chills or sore throat, or other symptoms of a cold or flu. Do not treat yourself. This drug decreases your body's ability to fight infections. Try to avoid being around people who are sick.  This medicine may increase your risk to bruise or bleed. Call your doctor or health care professional if you notice any unusual bleeding.  Be careful brushing and flossing your teeth or using a toothpick because you may get an infection or bleed more easily. If you have any dental work done, tell your dentist you are receiving this medicine.  Avoid taking products that contain aspirin, acetaminophen, ibuprofen, naproxen, or ketoprofen unless instructed by your doctor. These medicines may hide a fever.  Do not become pregnant while taking this medicine. Women  should inform their doctor if they wish to become pregnant or think they might be pregnant. There is a potential for serious side effects to an unborn child. Talk to your health care professional or pharmacist for more information. Do not breast-feed an infant while taking this medicine.  What side effects may I notice from receiving this medicine?  Side effects that you should report to your doctor or health care professional as soon as possible:    allergic reactions like skin rash, itching or hives, swelling of the face, lips, or tongue    low blood counts - this medicine may decrease the number of white blood cells, red blood cells and platelets. You may be at increased risk for infections and bleeding.    signs of infection - fever or chills, cough, sore throat, pain or difficulty passing urine    signs of decreased platelets or bleeding - bruising, pinpoint red spots on the skin, black, tarry stools, blood in the urine    signs of decreased red blood cells - unusually weak or tired, fainting spells, lightheadedness    breathing problems    chest pain    diarrhea    feeling faint or lightheaded, falls    flushing, runny nose, sweating during infusion    mouth sores or pain    pain, swelling, redness or irritation where injected    pain, swelling, warmth in the leg    pain, tingling, numbness in the hands or feet    problems with balance, talking, walking    stomach cramps, pain    trouble passing urine or change in the amount of urine    vomiting as to be unable to hold down drinks or food    yellowing of the eyes or skin  Side effects that usually do not require medical attention (report to your doctor or health care professional if they continue or are bothersome):    constipation    hair loss    headache    loss of appetite    nausea, vomiting    stomach upset  This list may not describe all possible side effects. Call your doctor for medical advice about side effects. You may report side effects to FDA at  1-800-FDA-1088.  Where should I keep my medicine?  This drug is given in a hospital or clinic and will not be stored at home.  NOTE:This sheet is a summary. It may not cover all possible information. If you have questions about this medicine, talk to your doctor, pharmacist, or health care provider. Copyright  2015 Gold Standard           Fluorouracil Solution for injection  What is this medicine?  FLUOROURACIL, 5-FU (flure oh YOOR a logan) is a chemotherapy drug. It slows the growth of cancer cells. This medicine is used to treat many types of cancer like breast cancer, colon or rectal cancer, pancreatic cancer, and stomach cancer.  This medicine may be used for other purposes; ask your health care provider or pharmacist if you have questions.  What should I tell my health care provider before I take this medicine?  They need to know if you have any of these conditions:    blood disorders    dihydropyrimidine dehydrogenase (DPD) deficiency    infection (especially a virus infection such as chickenpox, cold sores, or herpes)    kidney disease    liver disease    malnourished, poor nutrition    recent or ongoing radiation therapy    an unusual or allergic reaction to fluorouracil, other chemotherapy, other medicines, foods, dyes, or preservatives    pregnant or trying to get pregnant    breast-feeding  How should I use this medicine?  This drug is given as an infusion or injection into a vein. It is administered in a hospital or clinic by a specially trained health care professional.  Talk to your pediatrician regarding the use of this medicine in children. Special care may be needed.  Overdosage: If you think you have taken too much of this medicine contact a poison control center or emergency room at once.  NOTE: This medicine is only for you. Do not share this medicine with others.  What if I miss a dose?  It is important not to miss your dose. Call your doctor or health care professional if you are unable to keep  an appointment.  What may interact with this medicine?    allopurinol    cimetidine    dapsone    digoxin    hydroxyurea    leucovorin    levamisole    medicines for seizures like ethotoin, fosphenytoin, phenytoin    medicines to increase blood counts like filgrastim, pegfilgrastim, sargramostim    medicines that treat or prevent blood clots like warfarin, enoxaparin, and dalteparin    methotrexate    metronidazole    pyrimethamine    some other chemotherapy drugs like busulfan, cisplatin, estramustine, vinblastine    trimethoprim    trimetrexate    vaccines  Talk to your doctor or health care professional before taking any of these medicines:    acetaminophen    aspirin    ibuprofen    ketoprofen    naproxen  This list may not describe all possible interactions. Give your health care provider a list of all the medicines, herbs, non-prescription drugs, or dietary supplements you use. Also tell them if you smoke, drink alcohol, or use illegal drugs. Some items may interact with your medicine.  What should I watch for while using this medicine?  Visit your doctor for checks on your progress. This drug may make you feel generally unwell. This is not uncommon, as chemotherapy can affect healthy cells as well as cancer cells. Report any side effects. Continue your course of treatment even though you feel ill unless your doctor tells you to stop.  In some cases, you may be given additional medicines to help with side effects. Follow all directions for their use.  Call your doctor or health care professional for advice if you get a fever, chills or sore throat, or other symptoms of a cold or flu. Do not treat yourself. This drug decreases your body's ability to fight infections. Try to avoid being around people who are sick.  This medicine may increase your risk to bruise or bleed. Call your doctor or health care professional if you notice any unusual bleeding.  Be careful brushing and flossing your teeth or using a  toothpick because you may get an infection or bleed more easily. If you have any dental work done, tell your dentist you are receiving this medicine.  Avoid taking products that contain aspirin, acetaminophen, ibuprofen, naproxen, or ketoprofen unless instructed by your doctor. These medicines may hide a fever.  Do not become pregnant while taking this medicine. Women should inform their doctor if they wish to become pregnant or think they might be pregnant. There is a potential for serious side effects to an unborn child. Talk to your health care professional or pharmacist for more information. Do not breast-feed an infant while taking this medicine.  Men should inform their doctor if they wish to father a child. This medicine may lower sperm counts.  Do not treat diarrhea with over the counter products. Contact your doctor if you have diarrhea that lasts more than 2 days or if it is severe and watery.  This medicine can make you more sensitive to the sun. Keep out of the sun. If you cannot avoid being in the sun, wear protective clothing and use sunscreen. Do not use sun lamps or tanning beds/booths.  What side effects may I notice from receiving this medicine?  Side effects that you should report to your doctor or health care professional as soon as possible:    allergic reactions like skin rash, itching or hives, swelling of the face, lips, or tongue    low blood counts - this medicine may decrease the number of white blood cells, red blood cells and platelets. You may be at increased risk for infections and bleeding.    signs of infection - fever or chills, cough, sore throat, pain or difficulty passing urine    signs of decreased platelets or bleeding - bruising, pinpoint red spots on the skin, black, tarry stools, blood in the urine    signs of decreased red blood cells - unusually weak or tired, fainting spells, lightheadedness    breathing problems    changes in vision    chest pain    mouth sores    nausea  and vomiting    pain, swelling, redness at site where injected    pain, tingling, numbness in the hands or feet    redness, swelling, or sores on hands or feet    stomach pain    unusual bleeding  Side effects that usually do not require medical attention (report to your doctor or health care professional if they continue or are bothersome):    changes in finger or toe nails    diarrhea    dry or itchy skin    hair loss    headache    loss of appetite    sensitivity of eyes to the light    stomach upset    unusually teary eyes  This list may not describe all possible side effects. Call your doctor for medical advice about side effects. You may report side effects to FDA at 7-874-FDA-3802.  Where should I keep my medicine?  This drug is given in a hospital or clinic and will not be stored at home.  NOTE:This sheet is a summary. It may not cover all possible information. If you have questions about this medicine, talk to your doctor, pharmacist, or health care provider. Copyright  2015 Gold Standard

## 2021-06-11 NOTE — TELEPHONE ENCOUNTER
Navigator called patient back and relayed to him and wife that he can use Docusate for constipation.  Wife states she worked in a pharmacy for 40 years and is familiar with drug.  They verbalized appreciation.

## 2021-06-11 NOTE — PROGRESS NOTES
Pt is here for a new diagnosis of metastatic colon cancer. He has been at [facility] but would like a second opinion with Dr. Osullivan.

## 2021-06-11 NOTE — TELEPHONE ENCOUNTER
Patient calls in today stating that he had his first chemotherapy last week and he has had some rumbling in his abdomen with occasional sharp pains that leave as quickly as they come.  He is wondering if he can take Gas-X along with senna.  I discussed this with Donna Pena CNP who reports that yes he can take these medications.  Patient will try this and let us know if this does not seem to help.      Miya Colbert RN

## 2021-06-11 NOTE — PROGRESS NOTES
[facility] Hematology and Oncology Progress Note    Patient: Gregg Farfan  MRN: [id number]  Date of Service: 09/16/2020        Reason for Visit    1. Recurrent stage IV colon cancer to peritoneum, liver    ECOG Performance   ECOG Performance Status: 1    Distress Assessment  Distress Assessment Score: 7    Pain  Lumbar back - well-managed on pain meds    Assessment/Plan  1.    Recurrent stage IV colon cancer to peritoneum, liver  Had port placed.  Is back to start cycle 1 FOLFIRINOX + bevacizumab.  Will start with 20% dose-reduction for age and PS.  Education material provided at last visit and he signed consent. Reviewed drug side effects and answered questions in detail. He recorded the visit for he and his wife.  Will get baseline CEA.    The dex, compazine and irinotecan were sent to his pharmacy for him to  today. Reviewed use of this.    Will see back in 2 weeks ahead of cycle 2. Encouraged he call with any issues/unmanaged side effects in interim.  Will follow CEA and clinical symptoms, reimage after 4 cycles if doing well in interim.    Awaiting KRAS results.    2.    Malignant ascites  Has required therapeutic paracentesis every two weeks or so, last was yesterday. They were only able to remove 1 liter, so he had minimal benefit. Hopefully, after a few cycles of chemo, he will start noting symptom improvement and less reaccumulation.  Will place standing order for him to go in as needed.    3.    Hypokalemia  Likely secondary to poor intake. Will give 20 meq IV + 20 meq po in chemo today. Rx for 20 meq daily sent to his pharmacy to continue. Monitor on treatment.    4.    Poor appetite/early satiety  Small, frequent, high-caloric/high-protein meals/snacks reviewed. Supplement with nutritional drink 1-2/day if needed.    5.    Constipation  Tends towards constipation, may be from peritoneal disease/ascites slowing bowels. Encouraged he take laxative routinely to stay more regular. Did review he may  experience diarrhea after irinotecan and if that's the case to hold laxatives and may have to even take imodium as needed those days until resolves.   ______________________________________________________________________________    History of Present Illness/ Interval History    Mr. Gregg aFrfan  is a 70 y.o. returning for initiation of palliative chemotherapy.  Port-a-cath placed.     Abd distension. He had another para yesterday, but they were only able to safely drain 1000 ml due to bowel location, so he did not get a lot of symptom relief. Minimal pain, not taking anything for that. Some dyspnea. Fatigue and poor appetite/early satiety due to abd fullness. Constipation, small hard BMs daily. Taking stool softener as needed and drinking prune juice.    Review of Systems  Constitutional  Constitutional (WDL): Exceptions to WDL  Fatigue: Concerns  Neurosensory  Neurosensory (WDL): All neurosensory elements are within defined limits  Eye   Eye Disorder (WDL): All eye disorder elements are within defined limits  Ear  Ear Disorder (WDL): All ear disorder elements are within defined limits  Cardiovascular  Cardiovascular (WDL): Exceptions to WDL  Edema: Concerns(abdominal swelling)  Pulmonary  Respiratory (WDL): Exceptions to WDL  Cough: Concerns(post nasal drainage)  Dyspnea: Concerns(with abdominal fluid swelling)  Gastrointestinal  Gastrointestinal (WDL): Exceptions to WDL  Anorexia: Concerns(diminished appetite)  Dysgeusia: Concerns  Constipation: Concerns(post colon surgery, laxatives prn)  Genitourinary  Genitourinary (WDL): All genitourinary elements are within defined limits  Lymphatic  Lymph (WDL): All lymph disorder elements are within defined limits  Musculoskeletal and Connective Tissue  Musculoskeletal and Connetive Tissue Disorders (WDL): All Musculoskeletal and Connetive Tissue Disorder elements are within defined limits  Integumentary  Integumentary (WDL): All integumentary elements are within  "defined limits  Patient Coping  Patient Coping: Accepting;Open/discussion  Accompanied by  Accompanied by: Alone      Oncology History/Treatment  Diagnosis/Stage:   10/2019: ([id number]) mucinous adenocarcinoma sigmoid colon  -10 cm tumor on resection. Nodes negative.   -declined adjuvant chemo (Dr. Funes, [facility])    7/2020: Recurrent met cancer to omentum/ascites   -abd bloating/distension  -CT: ascites and omental carcinomatosis  -PET: omental lesions and one hepatic met-  -biopsy omental lesion: metastatic mucinous adenoca, consistent with colon primary  -BRAF, HER2/lory negative. KRAS pending    Treatment:  10/2019: colon resection    9/15/2020: Initiate palliative FOLFIRINOX, 20% dose-reduction and bevacizumab 5 mg/kg, for age and PS      Past History  Past Medical History:   Diagnosis Date     Allergic rhinitis      Gout      Hypertension      Renal insufficiency          Past Surgical History:   Procedure Laterality Date     COLON SURGERY       IR PORT PLACEMENT >5 YEARS  9/14/2020     JOINT REPLACEMENT Right 05/21/2018    hip     OR TOTAL HIP ARTHROPLASTY Right 5/21/2018    Procedure: RIGHT TOTAL HIP ARTHROPLASTY;  Surgeon: Quinton Leger MD;  Location: [facility] OR;  Service: Orthopedics     TONSILLECTOMY       US PARACENTESIS  8/14/2020      PARACENTESIS  9/1/2020      PARACENTESIS  9/15/2020       Physical Exam    Recent Vitals 9/16/2020   Height 5' 9\"   Weight 204 lbs 14 oz   BSA (m2) 2.13 m2   /80   Pulse 92   Temp 98   Temp src 1   SpO2 96   Some recent data might be hidden       GENERAL: Alert and oriented to time place and person. Seated comfortably. In no distress. Alone. Recorded visit.  HEAD: Atraumatic and normocephalic. No alopecia.  EYES: FERNY, EOMI. No erythema. No icterus.  ORAL CAVITY: Moist. No mucosal lesion or tonsillar enlargement.  NECK: supple. No thyroid enlargement.  LYMPH NODES: No palpable supraclavicular, cervical lymphadenopathy.  CHEST: clear to auscultation " bilaterally, dim bases bilaterally. Resonant to percussion throughout bilaterally. Symmetrical breath movements bilaterally.  CVS: S1 and S2 are heard. Regular rate and rhythm. No murmur or gallop or rub heard.  ABDOMEN: Distended, firm throughout. Non tender.  EXTREMITIES: Warm. Trace peripheral edema at ankle level.  SKIN: no rash, or bruising or purpura.   NEURO: No gross deficit noted. Non-antalgic gait.      Lab Results    Recent Results (from the past 168 hour(s))   COVID-19 Virus PCR MRF    Specimen: Respiratory   Result Value Ref Range    COVID-19 VIRUS SPECIMEN SOURCE Nasopharyngeal     2019-nCOV Not Detected    Comprehensive Metabolic Panel   Result Value Ref Range    Sodium 134 (L) 136 - 145 mmol/L    Potassium 3.1 (L) 3.5 - 5.0 mmol/L    Chloride 93 (L) 98 - 107 mmol/L    CO2 29 22 - 31 mmol/L    Anion Gap, Calculation 12 5 - 18 mmol/L    Glucose 101 70 - 125 mg/dL    BUN 22 8 - 28 mg/dL    Creatinine 0.95 0.70 - 1.30 mg/dL    GFR MDRD Af Amer >60 >60 mL/min/1.73m2    GFR MDRD Non Af Amer >60 >60 mL/min/1.73m2    Bilirubin, Total 0.6 0.0 - 1.0 mg/dL    Calcium 8.4 (L) 8.5 - 10.5 mg/dL    Protein, Total 6.3 6.0 - 8.0 g/dL    Albumin 2.6 (L) 3.5 - 5.0 g/dL    Alkaline Phosphatase 51 45 - 120 U/L    AST 30 0 - 40 U/L    ALT 16 0 - 45 U/L   HM1 (CBC with Diff)   Result Value Ref Range    WBC 6.6 4.0 - 11.0 thou/uL    RBC 4.71 4.40 - 6.20 mill/uL    Hemoglobin 13.2 (L) 14.0 - 18.0 g/dL    Hematocrit 39.6 (L) 40.0 - 54.0 %    MCV 84 80 - 100 fL    MCH 28.0 27.0 - 34.0 pg    MCHC 33.3 32.0 - 36.0 g/dL    RDW 15.2 (H) 11.0 - 14.5 %    Platelets 354 140 - 440 thou/uL    MPV 9.5 8.5 - 12.5 fL    Neutrophils % 74 (H) 50 - 70 %    Lymphocytes % 15 (L) 20 - 40 %    Monocytes % 9 2 - 10 %    Eosinophils % 2 0 - 6 %    Basophils % 1 0 - 2 %    Immature Granulocyte % 1 (H) <=0 %    Neutrophils Absolute 4.9 2.0 - 7.7 thou/uL    Lymphocytes Absolute 1.0 0.8 - 4.4 thou/uL    Monocytes Absolute 0.6 0.0 - 0.9 thou/uL     Eosinophils Absolute 0.1 0.0 - 0.4 thou/uL    Basophils Absolute 0.0 0.0 - 0.2 thou/uL    Immature Granulocyte Absolute 0.0 <=0.0 thou/uL       Imaging    Ir Port Placement 5+ Years    Result Date: 9/14/2020  [place] [facility] LOCATION: [facility] DATE: 9/14/2020 PROCEDURE: 1.  CENTRAL VENOUS PORT PLACEMENT 2.  FLUOROSCOPIC GUIDANCE FOR CATHETER PLACEMENT 3.  ULTRASOUND GUIDANCE FOR VASCULAR ACCESS 4.  CONSCIOUS SEDATION INTERVENTIONAL RADIOLOGIST: Killian Kulkarni MD. INDICATION: Metastatic colon cancer, port for chemotherapy. SEDATION: Versed 2 mg. Fentanyl 100 mcg. The procedure was performed with administration intravenous conscious sedation with appropriate preoperative, intraoperative, and postoperative evaluation. During the time-out, immediately prior to administration of medications, the patient was reassessed for adequacy to receive conscious sedation. 30 minutes of supervised face to face conscious sedation time was provided by a radiology nurse under my direct supervision. ANTIBIOTICS: Ancef 2 gram IV. Air Kerma: 2 mGy FLUOROSCOPIC TIME: 0.2 minutes CONTRAST: None. COMPLICATIONS: No immediate complications. STERILE BARRIER TECHNIQUE: Maximum sterile barrier technique was used. Cutaneous antisepsis was performed at the operative site with application of 2% chlorhexidine and large sterile drape. Prior to the procedure, the surgeon and assistant performed hand  hygiene and wore hat, mask, sterile gown, and sterile gloves during the entire procedure. PROCEDURE: Ultrasound demonstrated a patent and compressible right internal  jugular vein, and an ultrasound image was obtained and placed in the patient's permanent medical record. The right neck and chest were prepped and draped in usual sterile fashion. Using real-time ultrasound guidance, the right internal jugular vein was accessed. A subcutaneous pocket was created and irrigated with sterile normal saline. The catheter was tunneled in an antegrade  fashion. Over the guidewire, a peel-away sheath was advanced with fluoroscopic monitoring. Through the peel-away sheath, the catheter was advanced until the tip was at the cavoatrial junction. The catheter was cut to length and attached firmly to the port. The incisions were closed with layered absorbable  suture and surgical glue. FINDINGS: Ultrasound shows an anechoic and compressible jugular vein. At the completion of the study, the port tip lies at the cavoatrial junction. TYPE OF PORT:  Smith power PAC port     Successful power-injectable venous port placement. CPT codes for physician reference only: 75474 [id number]    Us Paracentesis    Result Date: 9/15/2020  EXAM: 1. PARACENTESIS 2. ULTRASOUND GUIDANCE LOCATION: [facility] DATE/TIME: 9/15/2020 1:58 PM INDICATION: Malignant ascites with peritoneal carcinomatosis PROCEDURE: Examination demonstrates ascites is becoming loculated with significant peritoneal tumor. Difficult to find a window clear of tumor to get to the ascites. Informed consent obtained. Time out performed. The abdomen was prepped and draped in a sterile fashion. Few mL of 1 percent lidocaine was infused into local soft tissues. A 5 Kiswahili catheter system was introduced into the abdominal ascites under ultrasound guidance. 1.1 liters of clear fluid was removed and sent to lab if requested. Patient tolerated procedure well. Ultrasound imaging was obtained and placed in the patient's permanent medical record.     1.  Status post ultrasound-guided paracentesis. 2.  Peritoneal tumor burden and loculations limit adequate drainage of the malignant ascites. Reference CPT Code: 66840    Us Paracentesis    Result Date: 9/1/2020  EXAM: 1. PARACENTESIS 2. ULTRASOUND GUIDANCE LOCATION: [facility] DATE/TIME: 9/1/2020 4:00 PM INDICATION: Ascites. PROCEDURE: Informed consent obtained. Time out performed. The abdomen was prepped and draped in a sterile fashion. 10 mL of 1 percent lidocaine  was infused into local soft tissues. A 5 Khmer catheter system was introduced into the abdominal ascites under ultrasound guidance. 4.2 liters of clear fluid was removed and sent to lab if requested. Patient tolerated procedure well. Ultrasound imaging was obtained and placed in the patient's permanent medical record.     1.  Status post ultrasound-guided paracentesis. Reference CPT Code: 64265      Billing  Total face to face time 45 minutes, with 30 minutes of that dedicated to counseling, education or coordination of care.     Signed by: Zuleima Patel

## 2021-06-11 NOTE — TELEPHONE ENCOUNTER
Patient calls in today just to discuss some symptoms/side effects that he is having.  He does state that he is struggling with a lack of appetite.  He is forcing himself to eat 6 small meals a day but does have to force this.  I let him know that this can be normal while on chemotherapy.  I let him know that since it has been about a week since treatment, this should start getting better.  He also complains of a dry mouth.  He is drinking plenty of fluids and was told he can suck hard candies to help with this.  He states that he is taking Senokot for his constipation and does feel like he needs a little something more.  I told him that he can take a dose of MiraLAX today and if no results then he can do another dose of MiraLAX tomorrow.  He states that he has been taking Gas-X for his abdominal gas.  I told him that if he could have a good bowel movement that this may relieve the gas problems as well.  And finally he states that he is having lots of phlegm.  This phlegm causes him to be nauseated.  This has been going on since prior to diagnosis.  He is wondering if he can use a decongestant or an over-the-counter medication to help with this.  I did let him know that this was fine to take an OTC decongestant such as Mucinex to help with this.  He was very appreciative and will give a call back if he needs anything further.    Miya Colbert RN

## 2021-06-11 NOTE — PROGRESS NOTES
Gregg came to chemo infusion following port lab draw and NP appointments for start of treatment using Avastin, Irinotecan, Oxaliplatin and 5FU.  Port maintained good blood return throughout treatment.  He has been educated by talon and consented.  He also received the education folder and contents reviewed while in treatment chair.  Each medication given was reviewed prior to administration.  He received treatment as ordered and tolerated it well while in clinic today. He was connected to a Cadd pump and his 46 hour home infusion of 5FU was started.  Gregg d/c from clinic ambulatory.  He will return Friday at 1400 for pump d/c.

## 2021-06-11 NOTE — PROGRESS NOTES
Patients wife called the clinic while he was getting his chemo today. She said that the steroids that they give him with his treatment caused him to not be able to sleep the last time he received chemo.  It was only a problem the first night so she is wondering if she can give him melatonin or benadryl before bed tonight.  I told her that she can try either of them.  She verbalized understanding and was appreciative.    Miya Colbert RN

## 2021-06-11 NOTE — PROGRESS NOTES
Pt ambulates to infusion center for lab draw prior to MD visit.  IVAD accessed, labs drawn et sent.  Pt was seen by Dr. Osullivan today and orders were approved.  Treatment administered as ordered without difficulty.  Continuous infusion of 5FU started via CADD pump w/settings double-checked and connections taped. Flu vaccine given after screening completed. Pt left clinic stable to [facility]. Plan RTC 10/2/2020 at 1300 for pump disconnect.

## 2021-06-11 NOTE — TELEPHONE ENCOUNTER
Navigator returned pt's voice message with questions about vaccinations, including the flu vaccine, based on his reading of the educational information regarding the chemotherapy he will be receiving.      Spoke to both patient and wife, Shani, together on speaker. With regard to flu vaccination, writer explains that he can get a flu shot from our clinic when they become available.    Shani reads that there can be interactions with vaccinations with Irinotecan and Fluorouracil, in particular. They have questions about this.     Pt has questions about hair loss and how long this generally takes to occur. Writer explained that this varies with individuals, and with different chemotherapy drugs. Recommended asking the infusion nurses.    Pt asks about constipation related to his colon surgery. States he got diarrhea when he used Miralax. Writer recommended using smaller dose of Miralax, and that I will ask clinic what other recommendations they have.     Will continue to follow.

## 2021-06-11 NOTE — TELEPHONE ENCOUNTER
Records in [facility] media & Care Everywhere: [facility].  Requested & collected external records.[facility], Colon&Rectal, [facility] and Dr.Richard Mansfield/PCP.    Epic Notes  No Physician Notes    Labs  9/1/2020 - Medical Cytology.  8/14/2020 - Medical Cytology.    Imaging  9/1/2020 - U/S Paracentesis, ordered by Dr. Potter.  8/14/2020 - U/S Paracentesis, ordered by Dr. Potter.    Media  [facility] -Dr. Potter.  8/19/2020 - Progress Note.  8/17/2020 - PET/CT - images in Nil.  8/10/2020 - Progress Note.  11/6/2019 - Consult Note.  1/1/2019 - Flowsheet / Medications, labs.    [facility]iology  8/4/2020 - CT abd & pelvis, images in Nil.  9/30/2019 - CT abd & pelvis, images in Nil.    Colon & Rectal - Dr. Guillermo.  3/27/2020 - Progress Note / Results letter.  3/13/2020 - Colonoscopy.  3/13/2020 - Pathology.  11/12/2019 - Progress Note.  10/22/2019 - Surgery Note / [facility]. Dr. Guillermo.  10/22/2019 -Pathology / [facility].  10/3/2019 - Progress Note.  9/30/2019 - Lab /[facility].  9/27/2019 - Colonoscopy.  9/27/2019 - Pathology.    Dr. [facility]  8/5/2020 - Progress Note.  8/4/2020 - Progress Note.  7/31/2020 - Labs.    Care Everywhere - [facility]  9/3/2020 - CT guided needle placement biopsy, omental mass. Images in Nil  9/3/2020 - Pathology.  10/22/2019 - Surgery, Dr. Guillermo.  10/22/2019 - Pathology.

## 2021-06-11 NOTE — CONSULTS
[facility] Hematology and Oncology Consult Note    Patient: Gregg Farfan  MRN: [id number]  Date of Service: 09/09/2020        Reason for Visit     1. Cancer of sigmoid colon (H)  IR Port Placement 5+ Years    Education (Chemo Class)    CC OFFICE VISIT LONG    Infusion Appointment   2. Encounter for antineoplastic chemotherapy  CC OFFICE VISIT LONG    Infusion Appointment         Assessment     1.  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  This is arising from sigmoid colon.  2.  No evidence of any BRAF mutation or HER-2/lory amplification.  KRAS status is unknown at this time.  3.  Significant ascites.  4.  Slight pleural effusion as well.  5.  Decent performance status so far feels    Plan     1.  I had lengthy discussion with the patient as well as his wife who was on the phone.  I was going to recommend the same treatment as he was recommended before.  However he is a very large volume disease.  I think he needs to be slightly more aggressive with his treatment.  I am going to start him on FOLFOXIRI therapy in which we give both oxaliplatin and irinotecan along with 5-FU.  He will need a Port-A-Cath placed.  Due to his age I am going to start with 20% dose reduction.  2.  Discussed with the patient this is not a curative treatment this is palliative.  We will have to balance his quality of life issues with treatment efficacy.  3.  We will follow-up on the K-pricila testing.  4.  Advised to continue good diet and exercise as tolerated.  5.  Given printed information about the chemotherapy regimen and medication.  He signed the consent.    Staging History     Cancer Staging  Cancer of sigmoid colon (H)  Staging form: Colon And Rectum, AJCC 8th Edition  - Clinical: rcT4a - Unsigned  - Pathologic stage from 9/9/2020: Stage IVC (pT4b, pN0, pM1c) - Signed by Uday Osullivan MD on 9/9/2020  CEA: 68  KRAS: Unknown  BRAF: Negative      History     Patient is a very pleasant  70 years old gentleman who has been referred to me for evaluation of newly diagnosed metastatic colon cancer.  The patient initially was diagnosed with colon cancer back in October 2019.  He underwent resection of the sigmoid colon in which there was a 10 cm tumor T4 N0 M0 with mucinous features.  There is mucinous adenocarcinoma.  All the 17 lymph nodes were negative.  He was seen by Dr. Venkat Funes at [facility] after surgery.  The patient apparently did not want to get any adjuvant chemotherapy.    He started to present with increasing abdominal distention sometime in July 2020.  He was seen by his regular doctor and had a CT scan done which showed ascites and omental carcinomatosis.  He was then seen by Dr. Peter again.  He had a PET scan which showed hypermetabolic lesions in the omentum as well as 1 metastatic lesion in his liver as well.  The biopsy of the omentum confirmed metastatic mucinous adenocarcinoma compatible with colon primary.  Molecular testing shows B NORAH and HER-2/lory -3.  K-pricila is pending.    He comes in today to discuss his treatment options.  He is visibly distended in his abdomen.  He does have some degree of shortness of breath.  Some degree of fatigue.  Poor appetite.    Past Medical History     Past Medical History:   Diagnosis Date     Allergic rhinitis      Gout      Hyperlipidemia      Hypertension      Renal insufficiency        Past Social History     Social History     Socioeconomic History     Marital status:      Spouse name: Not on file     Number of children: Not on file     Years of education: Not on file     Highest education level: Not on file   Occupational History     Not on file   Social Needs     Financial resource strain: Not on file     Food insecurity     Worry: Not on file     Inability: Not on file     Transportation needs     Medical: Not on file     Non-medical: Not on file   Tobacco Use     Smoking status: Never Smoker     Smokeless tobacco:  Never Used   Substance and Sexual Activity     Alcohol use: No     Drug use: No     Sexual activity: Never   Lifestyle     Physical activity     Days per week: Not on file     Minutes per session: Not on file     Stress: Not on file   Relationships     Social connections     Talks on phone: Not on file     Gets together: Not on file     Attends Muslim service: Not on file     Active member of club or organization: Not on file     Attends meetings of clubs or organizations: Not on file     Relationship status: Not on file     Intimate partner violence     Fear of current or ex partner: Not on file     Emotionally abused: Not on file     Physically abused: Not on file     Forced sexual activity: Not on file   Other Topics Concern     Not on file   Social History Narrative     Not on file       Family History     Family History   Problem Relation Age of Onset     Colon cancer Maternal Uncle 70     Breast cancer Paternal Grandmother 70     Colon cancer Maternal cousin 50       Medication List     Prior to Admission medications    Medication Sig Start Date End Date Taking? Authorizing Provider   vit C/E/Zn/coppr/lutein/zeaxan (PRESERVISION AREDS-2 ORAL) Take 1 tablet by mouth daily.   Yes PROVIDER, HISTORICAL   acetaminophen (TYLENOL) 500 MG tablet Take 1,000 mg by mouth every 6 (six) hours as needed for pain.    PROVIDER, HISTORICAL   allopurinol (ZYLOPRIM) 100 MG tablet Take 100 mg by mouth 3 (three) times a day.    PROVIDER, HISTORICAL   aspirin 325 MG tablet Take 1 tablet (325 mg total) by mouth 2 (two) times a day with meals. 5/22/18   Ray Neumann PA-C   cholecalciferol, vitamin D3, 1,000 unit tablet Take 1,000 Units by mouth daily.    PROVIDER, HISTORICAL   docusate sodium (COLACE) 100 MG capsule Take 1 capsule (100 mg total) by mouth 2 (two) times a day as needed for constipation. 5/22/18   Ray Neumann PA-C   hydroCHLOROthiazide (HYDRODIURIL) 25 MG tablet TK 1 T PO D FOR BP 8/30/20   PROVIDER,  HISTORICAL   Lactobacillus rhamnosus GG (CULTURELLE) 10-15 Billion cell capsule Take 1 capsule by mouth daily.    PROVIDER, HISTORICAL   multivitamin with minerals (THERA-M) 9 mg iron-400 mcg Tab tablet Take 1 tablet by mouth daily.    PROVIDER, HISTORICAL   oxyCODONE (ROXICODONE) 5 MG immediate release tablet Take 1-2 tablets (5-10 mg total) by mouth every 4 (four) hours as needed for pain. 5/22/18   Ray Neumann PA-C       Allergies     No Known Allergies    Review of Systems   A comprehensive review of 14 systems was done. Pertinent findings noted here and in history of present illness. All the rest negative.    General  General (WDL): Exceptions to WDL  Fatigue: Yes - Recent (Less than 3 months)  ENT  ENT (WDL): Exceptions to WDL  Dental Problems: Yes - Recent (Less than 3 months)  Respiratory  Respiratory (WDL): Exceptions to WDL  Dyspnea: Yes - Recent (Less than 3 months)  Cardiovascular  Cardiovascular (WDL): All cardiovascular elements are within defined limits  Endocrine  Endocrine (WDL): All endocrine elements are within defined limits  Gastrointestinal  Gastrointestinal (WDL): Exceptions to WDL  Poor Appetite: Yes- Recent (Less than 3 months)  Musculoskeletal  Musculoskeletal (WDL): All musculoskeletal elements are within defined limits  Neurological  Neurological (WDL): All neurological elements are within defined limits  Dominant Hand: Right  Psychological/Emotional  Psychological/Emotional (WDL): Exceptions to WDL  Depression: Yes - Recent (Less than 3 months)  Hematological/Lymphatic  Hematological/Lymphatic (WDL): All hematological/lymphatic elements are within defined limits  Dermatological  Dermatologic (WDL): All dermatological elements are within defined limits  Genitourinary/Reproductive  Genitourinary/Reproductive (WDL): Exceptions to WDL  Urination more than 2 times a night: Yes - Recent (Less than 3 months)  Reproductive (Females only)     Pain  Currently in Pain:  No/denies         Physical Exam     Recent Vitals 5/23/2018   Height -   Weight -   BSA (m2) -   /68   Pulse 71   Temp 98.2   Temp src 1   SpO2 98   Some recent data might be hidden       Constitutional: Oriented to person, place, and time and appears well-developed.   HEENT:  Normocephalic and atraumatic.  Eyes: Conjunctivae and EOM are normal. Pupils are equal, round, and reactive to light. No discharge.  No scleral icterus. Nose normal. Mouth/Throat: Oropharynx is clear and moist. No oropharyngeal exudate.    NECK: Normal range of motion. Neck supple. No JVD present. No tracheal deviation present. No thyromegaly present.   CARDIOVASCULAR: Normal rate, regular rhythm and intact distal pulses.  Exam reveals no gallop and no friction rub.  Systolic murmur present.  PULMONARY: Seems slightly short of breath.  Effort normal and breath sounds normal.  Decreased air entry at the left base.  ABDOMEN: He has fairly distended abdomen.  There is a firm mass occupying the middle half of his abdomen.  There is ascites present.  MUSCULOSKELETAL: Normal range of motion. No edema and no tenderness. Mild kyphosis, no tenderness.  LYMPH NODES: Has no cervical, supraclavicular, axillary and groin adenopathy.   NEUROLOGICAL: Alert and oriented to person, place, and time. No cranial nerve deficit.  Normal muscle tone. Coordination normal.   GENITOURINARY: Deferred exam.  SKIN: Skin is warm and dry. No rash noted. No erythema. No pallor.   EXTREMITIES: No cyanosis, no clubbing, 1+ bilateral lower extremity edema. No Deformity.  PSYCHIATRIC: Normal mood, affect and behavior.      Lab Results     No results found for this or any previous visit (from the past 48 hour(s)).    Lab Results   Component Value Date    ALT 13 07/31/2020    AST 28 07/31/2020    CHOL 156 07/31/2020    CREATININE 1.43 (H) 07/31/2020    HGB 11.2 (L) 05/23/2018    K 4.7 05/22/2018     Reviewed his path report which shows mucinous adenocarcinoma of the colon.   His recurrent tumor in his omentum biopsy also shows mucinous adenocarcinoma.  This is microsatellite stable.  There is no evidence of any BRAF mutation.  There is no evidence of any HER-2/lory amplification.  K-pricila status is unknown.      Imaging Results     Us Paracentesis    Result Date: 9/1/2020  EXAM: 1. PARACENTESIS 2. ULTRASOUND GUIDANCE LOCATION: [facility] DATE/TIME: 9/1/2020 4:00 PM INDICATION: Ascites. PROCEDURE: Informed consent obtained. Time out performed. The abdomen was prepped and draped in a sterile fashion. 10 mL of 1 percent lidocaine was infused into local soft tissues. A 5 Cam[place]onian catheter system was introduced into the abdominal ascites under ultrasound guidance. 4.2 liters of clear fluid was removed and sent to lab if requested. Patient tolerated procedure well. Ultrasound imaging was obtained and placed in the patient's permanent medical record.     1.  Status post ultrasound-guided paracentesis. Reference CPT Code: 86003    Us Paracentesis    Result Date: 8/14/2020  EXAM: 1. PARACENTESIS 2. ULTRASOUND GUIDANCE LOCATION: [facility] DATE/TIME: 8/14/2020 1:11 PM INDICATION: Ascites. PROCEDURE: Informed consent obtained. Time out performed. The abdomen was prepped and draped in a sterile fashion. 8  mL of 1 percent lidocaine was infused into local soft tissues. A 5 Cam[place]onian catheter system was introduced into the abdominal ascites under ultrasound guidance. 7.7  liters of clear fluid was removed and sent to lab if requested. Patient tolerated procedure well. Ultrasound imaging was obtained and placed in the patient's permanent medical record.     1.  Status post ultrasound-guided paracentesis. Reference CPT Code: 86411    Pet External Imaging    Result Date: 9/4/2020  Images were obtained from an external facility.  Click PACS Images hyperlink to view images.  Textual results have been scanned into the media tab.      I have personally reviewed the images with the patient and  compared them to the prior scans and appreciated the difference.  All the patient's questions were answered      Total time spent was 60 minutes, more than half of it was in face-to-face counseling regarding disease state, review of available images, laboratory values, pathological reports, treatment, options, their side effects and management.    Uday Osullivan MD

## 2021-06-11 NOTE — PROGRESS NOTES
[facility] Cancer Care Progress Note    Patient: Gregg Farfan  MRN: [id number]  Date of Service: 9/30/2020        Reason for visit      1. Encounter for antineoplastic chemotherapy    2. Cancer of sigmoid colon (H)    3. Shortness of breath        Assessment        1.  A very pleasant 70 y.o.  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  This is arising from sigmoid colon.  Started on palliative chemotherapy with FOLFOXIR with Avastin.I started on dose reduction but still with a lot of side effects.  He had significant tiredness fatigue as well as some diarrhea.  Seems to be getting better.  2.  He has K-pricila mutation.  3.  Significant ascites.  This seems to have stabilized.    4.  Slight pleural effusion as well. He appears more short of breath however.  5.  Decent performance status so far feels  6.  Slight chemotherapy-induced diarrhea.    Plan     1.  Due to the fact that he is still having diarrhea Maxi not going to give him a irinotecan today.  We will continue with the lower dose of oxaliplatin as well as 5-FU.  Same dose of Avastin.  2.  On his cycle #3 we will go back on irinotecan depending upon how he is feeling and how his GI system does after this cycle.  If the diarrhea continues to be a problem then we may have to admit irinotecan altogether.  We will have to go up on the dose of oxaliplatin because at this point he is at almost 30% dose reduction.  3.  We will also get a PE angiogram of his chest to make sure there is no pulmonary embolism.  4.  Discussed with the patient about appetite as well as management of diarrhea.  5.  He will come back Monday Wednesday Friday for IV fluids the following week.    Clinical stage      Cancer Staging  Cancer of sigmoid colon (H)  Staging form: Colon And Rectum, AJCC 8th Edition  - Clinical: rcT4a - Unsigned  - Pathologic stage from 9/9/2020: Stage IVC (pT4b, pN0, pM1c) - Signed by Uday Osullivan MD on 9/9/2020  CEA: 70  KRAS:  Unknown  BRAF: Negative  K-pricila mutation positive.  However he also has PI K3 CA mutation, APC mutation.  Microsatellite stable.    History     Gregg Farfan is a very pleasant 70 y.o. old male with a history of  metastatic colon cancer.  The patient initially was diagnosed with colon cancer back in October 2019.  He underwent resection of the sigmoid colon in which there was a 10 cm tumor T4 N0 M0 with mucinous features.  There is mucinous adenocarcinoma.  All the 17 lymph nodes were negative.  He was seen by Dr. Venkat Funes at [place] [facility] after surgery.  The patient apparently did not want to get any adjuvant chemotherapy.     He started to present with increasing abdominal distention sometime in July 2020.  He was seen by his regular doctor and had a CT scan done which showed ascites and omental carcinomatosis.  He was then seen by Dr. Peter again.  He had a PET scan which showed hypermetabolic lesions in the omentum as well as 1 metastatic lesion in his liver as well.  The biopsy of the omentum confirmed metastatic mucinous adenocarcinoma compatible with colon primary.  Molecular testing shows K-pricila mutation.  However he also has PI K3 CA mutation, APC mutation.  Microsatellite stable.     He was seen by me and we recommended aggressive treatment with FOLFOXIRI with Avastin.  He is not a candidate for cetuximab due to K-pricila mutation.    We started him on a lower dose of chemotherapy due to his age and performance status.  He ended up getting oxaliplatin at 110 mg total dose.  This is almost 40% dose reduction.  He also had reduction of iron taken in 5-FU doses.  In spite of that he had significant diarrhea although not bad enough to cause dehydration.  He did need to come in and get some IV fluids.  His appetite is very poor.  He has lost about 20 pounds weight.  On the positive side he does feel that the fluid buildup in his belly has decreased.  He does appear little bit more short of breath.    Past  Medical History     Past Medical History:   Diagnosis Date     Allergic rhinitis      Gout      Hypertension      Renal insufficiency            Review of Systems   Constitutional  Constitutional (WDL): Exceptions to WDL  Fatigue: Fatigue not relieved by rest - Limiting instrumental ADL  Weight Loss: to <10% from baseline, intervention not indicated(down 20lbs since 9/16)  Neurosensory  Neurosensory (WDL): Exceptions to WDL  Ataxia: Asymptomatic, clinical or diagnostic observations only, intervention not indicated(when first getting up)  Cardiovascular  Cardiovascular (WDL): All cardiovascular elements are within defined limits  Pulmonary  Respiratory (WDL): Exceptions to WDL  Dyspnea: Shortness of breath at rest, limiting self care ADL(pretty constant)  Gastrointestinal  Gastrointestinal (WDL): Exceptions to WDL  Anorexia: Oral intake altered without significant weight loss or malnutrition, oral nutritional supplements indicated(no appetite)  Diarrhea: Increase of <4 stools per day over baseline, mild increase in ostomy output compared to baseline(diarrhea post TX, improved LBM yesterday)  Nausea: Loss of appetite without alteration in eating habits  Dysgeusia: Altered taste but no change in diet(no sense of taste)  Dry Mouth: Symptomatic (e.g., dry or thick saliva) without significant dietary alteration, unstimulated saliva flow >0.2 ml/min  Genitourinary  Genitourinary (WDL): All genitourinary elements are within defined limits  Integumentary  Integumentary (WDL): All integumentary elements are within defined limits  Patient Coping  Patient Coping: Accepting  Accompanied by  Accompanied by: Alone    ECOG performance status and Distress Assessment      ECOG Performance:    ECOG Performance Status: 2    Distress Assessment  Distress Assessment Score: No distress:     Pain Status  Currently in Pain: No/denies        Vital Signs     Vitals:    09/30/20 0907   BP: 117/75   Pulse: 91   Temp: 97.4  F (36.3  C)   SpO2:  98%       Physical Exam     GENERAL: No acute distress. Cooperative in conversation.   HEENT: Pupils are equal, round and reactive. Oral mucosa is clean and intact. No ulcerations or mucositis noted. No bleeding noted.  RESP:Chest symmetric lungs are clear bilaterally per auscultation. Regular respiratory rate. No wheezes or rhonchi.  CV: Normal S1 S2 Regular, rate and rhythm. No murmurs.  ABD: Abdomen is obviously distended.  It is firm mass on the upper two thirds of the abdomen.  Minimal ascites that I can feel.    EXTREMITIES: No lower extremity edema.   NEURO: Non- focal. Alert and oriented x3.  Cranial nerves appear intact.  PSYCH: Within normal limits. No depression or anxiety.  SKIN: Warm dry intact.    LYMPH NODES: Bilateral cervical, supraclavicular, axillary lymph node examination was done.  Negative for any palpable adenopathy.      Lab Results     Results for orders placed or performed in visit on 09/30/20   Comprehensive Metabolic Panel   Result Value Ref Range    Sodium 130 (L) 136 - 145 mmol/L    Potassium 3.5 3.5 - 5.0 mmol/L    Chloride 100 98 - 107 mmol/L    CO2 20 (L) 22 - 31 mmol/L    Anion Gap, Calculation 10 5 - 18 mmol/L    Glucose 110 70 - 125 mg/dL    BUN 29 (H) 8 - 28 mg/dL    Creatinine 1.30 0.70 - 1.30 mg/dL    GFR MDRD Af Amer >60 >60 mL/min/1.73m2    GFR MDRD Non Af Amer 54 (L) >60 mL/min/1.73m2    Bilirubin, Total 0.3 0.0 - 1.0 mg/dL    Calcium 8.9 8.5 - 10.5 mg/dL    Protein, Total 6.1 6.0 - 8.0 g/dL    Albumin 2.6 (L) 3.5 - 5.0 g/dL    Alkaline Phosphatase 82 45 - 120 U/L    AST 23 0 - 40 U/L    ALT 24 0 - 45 U/L   HM1 (CBC with Diff)   Result Value Ref Range    WBC 8.5 4.0 - 11.0 thou/uL    RBC 4.64 4.40 - 6.20 mill/uL    Hemoglobin 12.9 (L) 14.0 - 18.0 g/dL    Hematocrit 37.9 (L) 40.0 - 54.0 %    MCV 82 80 - 100 fL    MCH 27.8 27.0 - 34.0 pg    MCHC 34.0 32.0 - 36.0 g/dL    RDW 15.8 (H) 11.0 - 14.5 %    Platelets 309 140 - 440 thou/uL    MPV 10.1 8.5 - 12.5 fL    Neutrophils % 76  (H) 50 - 70 %    Lymphocytes % 17 (L) 20 - 40 %    Monocytes % 5 2 - 10 %    Eosinophils % 0 0 - 6 %    Basophils % 0 0 - 2 %    Immature Granulocyte % 1 (H) <=0 %    Neutrophils Absolute 6.4 2.0 - 7.7 thou/uL    Lymphocytes Absolute 1.5 0.8 - 4.4 thou/uL    Monocytes Absolute 0.5 0.0 - 0.9 thou/uL    Eosinophils Absolute 0.0 0.0 - 0.4 thou/uL    Basophils Absolute 0.0 0.0 - 0.2 thou/uL    Immature Granulocyte Absolute 0.1 (H) <=0.0 thou/uL   Manual Differential   Result Value Ref Range    Platelet Estimate Normal Normal    Ovalocytes 1+ (!) Negative    Polychromasia 1+ (!) Negative    Reactive Lymphocytes 1+ (!) Negative         Imaging Results     Ir Port Placement 5+ Years    Result Date: 9/14/2020  [place] [facility] LOCATION: [facility] DATE: 9/14/2020 PROCEDURE: 1.  CENTRAL VENOUS PORT PLACEMENT 2.  FLUOROSCOPIC GUIDANCE FOR CATHETER PLACEMENT 3.  ULTRASOUND GUIDANCE FOR VASCULAR ACCESS 4.  CONSCIOUS SEDATION INTERVENTIONAL RADIOLOGIST: Killian Kulkarni MD. INDICATION: Metastatic colon cancer, port for chemotherapy. SEDATION: Versed 2 mg. Fentanyl 100 mcg. The procedure was performed with administration intravenous conscious sedation with appropriate preoperative, intraoperative, and postoperative evaluation. During the time-out, immediately prior to administration of medications, the patient was reassessed for adequacy to receive conscious sedation. 30 minutes of supervised face to face conscious sedation time was provided by a radiology nurse under my direct supervision. ANTIBIOTICS: Ancef 2 gram IV. Air Kerma: 2 mGy FLUOROSCOPIC TIME: 0.2 minutes CONTRAST: None. COMPLICATIONS: No immediate complications. STERILE BARRIER TECHNIQUE: Maximum sterile barrier technique was used. Cutaneous antisepsis was performed at the operative site with application of 2% chlorhexidine and large sterile drape. Prior to the procedure, the surgeon and assistant performed hand  hygiene and wore hat, mask, sterile gown,  and sterile gloves during the entire procedure. PROCEDURE: Ultrasound demonstrated a patent and compressible right internal  jugular vein, and an ultrasound image was obtained and placed in the patient's permanent medical record. The right neck and chest were prepped and draped in usual sterile fashion. Using real-time ultrasound guidance, the right internal jugular vein was accessed. A subcutaneous pocket was created and irrigated with sterile normal saline. The catheter was tunneled in an antegrade fashion. Over the guidewire, a peel-away sheath was advanced with fluoroscopic monitoring. Through the peel-away sheath, the catheter was advanced until the tip was at the cavoatrial junction. The catheter was cut to length and attached firmly to the port. The incisions were closed with layered absorbable  suture and surgical glue. FINDINGS: Ultrasound shows an anechoic and compressible jugular vein. At the completion of the study, the port tip lies at the cavoatrial junction. TYPE OF PORT:  Smith power PAC port     Successful power-injectable venous port placement. CPT codes for physician reference only: 71657 [id number]    Us Paracentesis    Result Date: 9/15/2020  EXAM: 1. PARACENTESIS 2. ULTRASOUND GUIDANCE LOCATION: [facility] DATE/TIME: 9/15/2020 1:58 PM INDICATION: Malignant ascites with peritoneal carcinomatosis PROCEDURE: Examination demonstrates ascites is becoming loculated with significant peritoneal tumor. Difficult to find a window clear of tumor to get to the ascites. Informed consent obtained. Time out performed. The abdomen was prepped and draped in a sterile fashion. Few mL of 1 percent lidocaine was infused into local soft tissues. A 5 Mauritanian catheter system was introduced into the abdominal ascites under ultrasound guidance. 1.1 liters of clear fluid was removed and sent to lab if requested. Patient tolerated procedure well. Ultrasound imaging was obtained and placed in the patient's  permanent medical record.     1.  Status post ultrasound-guided paracentesis. 2.  Peritoneal tumor burden and loculations limit adequate drainage of the malignant ascites. Reference CPT Code: 37499    Us Paracentesis    Result Date: 9/1/2020  EXAM: 1. PARACENTESIS 2. ULTRASOUND GUIDANCE LOCATION: [facility] DATE/TIME: 9/1/2020 4:00 PM INDICATION: Ascites. PROCEDURE: Informed consent obtained. Time out performed. The abdomen was prepped and draped in a sterile fashion. 10 mL of 1 percent lidocaine was infused into local soft tissues. A 5 Icelandic catheter system was introduced into the abdominal ascites under ultrasound guidance. 4.2 liters of clear fluid was removed and sent to lab if requested. Patient tolerated procedure well. Ultrasound imaging was obtained and placed in the patient's permanent medical record.     1.  Status post ultrasound-guided paracentesis. Reference CPT Code: 43851        Uday Osullivan MD

## 2021-06-11 NOTE — PROGRESS NOTES
"Social Work Follow-Up Encounter Visit  Oncology Clinic    Data/Intervention: 2020  Patient Name:  Gregg Farfan  /Age:  1950 (70 y.o.)    Reason for Follow-Up: ANGELITA met with Gregg to check in and assess for resource/support needs. Gregg is a 70 year old gentleman recently diagnosed with stage IV colon cancer. Gregg shares that he was originally diagnosed around October, had surgery, and was told they removed it all. Around  or July he states he started feeling bloated and went in to get assessed and found out about his current diagnosis. Gregg feels he has a really strong carlos manuel and trusts God has a plan for him. Gregg does share that he feels worried that he will \"stay on chemo forever\" and questions if he will know \"when it's time to stop\". ANGELITA encourages him to continue having those conversations with his MD and to continue assessing his own quality vs quantity of life. Gregg feels he is ready to give  this a \" good fight\" and is motivated to \"beat this\".     Gregg currently lives with his spouse, whom he has been  to for 51 years. They currently live in a [facility] in [place]. They have 2  Adult children and 4 grandchildren that live local. He feels he has a really strong support system and is grateful for this. Gregg shares that he retired in  and worked as an investment  for 22 years. He states he really enjoyed his job and his co-workers.     Gregg also shares that he is a recovering alcoholic and has been in recovery for approx 20 years. He states that in his line of work there was a lot of alcohol and at one point he felt he just needed to stop. He attends an AA support group in [facility] [place].      Gregg feels he is doing well in all other aspects of his life. He finds a lot of comfort from his carlos manuel and is \"ok with whatever path God chooses for me\". He reports he and his spouse sometimes have moments of sadness realizing this year may be some \"lasts\" for them. " "Gregg reads a book \"50 Days of Hope\" that was gifted to him by his son. He feels this will assist him in getting through difficult days.     Gregg enquired about support groups that are available to him. ANGELITA provided him with [employer] info as they have all virtual groups at this time. SW encouraged him to look into the different groups that are available in order to find a group that is a good fit for him. Gregg agrees and plans to look into this resource more. ANGELITA also provided SW contact info and encouraged him to reach out if any additional needs arise. Gregg encourages SW to stop in at future appointments and check in.      Resources Provided:  Active listening and emotional support  [employer]  ANGELITA contact info    Plan:  Provided patient/family with writer's contact information and availability.   SW will remain available for ongoing psychosocial support/resource needs.    BASHIR Godfrey, [facility]  Phone: [phone]  Pager: [phone]  "

## 2021-06-11 NOTE — PRE-PROCEDURE
Procedure Name: right chest port placement   Date/Time: 9/14/2020 11:36 AM  Written consent obtained?: Yes  Risks and benefits: Risks, benefits and alternatives were discussed  Consent given by: patient  Expected level of sedation: moderate  ASA Class: Class 3- Severe systemic disease, definite functional limitations  Mallampati: Grade 2- soft palate, base of uvula, tonsillar pillars, and portion of posterior pharyngeal wall visible  Patient states understanding of procedure being performed: Yes  Patient's understanding of procedure matches consent: Yes  Procedure consent matches procedure scheduled: Yes  Appropriately NPO: yes  Lungs: lungs clear with good breath sounds bilaterally  Heart: normal heart sounds and rate  History & Physical reviewed: Abbreviated history and physical done prior to moderate sedation  Statement of review: I have reviewed the lab findings, diagnostic data, medications, and the plan for sedation

## 2021-06-11 NOTE — TELEPHONE ENCOUNTER
Patient's wife Shani calls in today stating that Gregg is been having trouble with diarrhea since last Wednesday.  He had been given some recommendations over the night on Thursday and will be followed up with him on Friday 9/25, he felt like he could push fluids at home and would be fine.  Wife states that he has struggled all weekend.  I discussed this with Dr Osullivan who states that he should come in for labs and IV fluids.  These orders have been placed and Shani verbalized understanding.  We asked that he be here by 11 AM.  She said they will be there as soon as they can.    Miya Colbert RN

## 2021-06-11 NOTE — PROGRESS NOTES
Pt here ambulatory with walker and very weak. PT states chronic diarrhea/nothing taste good/ nausea.PT took compazine but it made him dizzy so does not want to take that. Pt has had 5 loose stools this am. Reviewed with Donna MOORE and compazine changed to zofran for take home antinausea meds. Zofran/decadron ivp ordered with ns one liter iv hydration. This was administered. Immodium 2 tablets given and taken. PT did have another loose stool after the two were taken so a third immodium given. Lab results reviewed. Instructed pt and wife to take immodium one tablet after each loose stool not to exceed 8 tablets in one day. Also instructed pt to take zofran three times a day if pt has nausea. Txt completed/port flushed/deaccessed with 2x2 to site. Follow up reviewed and pt dc'd ambulatory with walker/wife with pt.

## 2021-06-11 NOTE — TELEPHONE ENCOUNTER
Gregg called the on-call MD last night with concerns about diarrhea. I call Gregg this morning to check in on him and offer some IV hydration. He indicates that he is feeling much better. His stools aren't as runny as they were last night. He chooses to stay home and hydrate on his own. I advised him to keep us posted with any concerns.     He verbalized understanding and appreciation of our conversation.     He will be back in clinic on weds, 9/30.    Nyasia Rosales RN, Oncology Clinic   [facility]

## 2021-06-12 NOTE — PROGRESS NOTES
Pt arrived to infusion clinic. Chemo pump DC'd. Port flushed with Saline and Heparin, de-accessed, band-aid applied. Pt ambulated out of infusion clinic independently.

## 2021-06-12 NOTE — ANESTHESIA PREPROCEDURE EVALUATION
Anesthesia Evaluation      Patient summary reviewed   No history of anesthetic complications     Airway   Mallampati: II  Neck ROM: full   Pulmonary - negative ROS and normal exam    breath sounds clear to auscultation    PE comment: Increased rate of breathing                         Cardiovascular - normal exam  Exercise tolerance: > or = 4 METS  (+) hypertension, ,   beta blocker therapy NOT received within 24 hours of incision.,  ECG reviewed  Rhythm: regular  Rate: normal,      ROS comment: Acidosis  Presumed sepsis  hypotension     Neuro/Psych - negative ROS     Endo/Other    (+) arthritis,      Comments: gout    GI/Hepatic/Renal    (+)   chronic renal disease,      Other findings: colon cancer, currently undergoing chemotherapy presents for evaluation of diarrhea and increasing generalized weakness    Results for BRANDO MCDONALD (MRN [id number]) as of 10/13/2020 19:58    10/13/2020 16:46  Lactic Acid: 3.5 (H)  Results for BRANDO MCDONALD (MRN [id number]) as of 10/13/2020 19:58    10/13/2020 15:00  Sodium: 129 (L)  Potassium: 4.6  Chloride: 99  CO2: 16 (L)  Anion Gap, Calculation: 14  BUN: 28  Creatinine: 1.32 (H)  GFR MDRD Af Amer: >60  GFR MDRD Non Af Amer: 53 (L)  Results for BRANDO MCDONALD (MRN [id number]) as of 10/13/2020 19:58    10/13/2020 15:00  Glucose: 62 (L)  Lactic Acid: 3.3 (H)  INR: 2.76 (H)  PTT: 60 (H)  WBC: 4.5  RBC: 4.79  Hemoglobin: 13.1 (L)  Hematocrit: 38.1 (L)  MCV: 80  MCH: 27.3  MCHC: 34.4  RDW: 17.2 (H)  Platelets: 240   Confused      Dental - normal exam                          Anesthesia Plan  Planned anesthetic: general endotracheal  GAETT  RSI with etomidate  Antiemetics  Central line for pressors - levophed/vasopressin  Kailee for BP and intra op sampling  ICU post op planned  Check labs ABG/Hgb/lactic acid/BMP introp  Type and screen sent stat  ASA 4 - emergent   Induction: intravenous   Anesthetic plan and risks discussed with: patient and spouse  Anesthesia plan special  considerations: rapid sequence induction, antiemetics, CVP line, arterial catheterization,   Post-op plan: routine recovery and extended intubation/vent support (extended intubation is possible)

## 2021-06-12 NOTE — ANESTHESIA POSTPROCEDURE EVALUATION
Patient: Gregg Farfan  Procedure(s):  LAPAROTOMY WITH WASHOUT  Anesthesia type: general    Patient location: ICU  Last vitals:   Vitals Value Taken Time   /54 10/13/20 2207   Temp 36.8  C (98.3  F) 10/14/20 0400   Pulse 99 10/14/20 0559   Resp 19 10/14/20 0559   SpO2 100 % 10/14/20 0559   Vitals shown include unvalidated device data.  Post vital signs: stable  Level of consciousness: awake and responds to simple questions  Post-anesthesia pain: pain controlled  Post-anesthesia nausea and vomiting: no  Pulmonary: unassisted, return to baseline  Cardiovascular: stable and blood pressure at baseline  Hydration: adequate  Anesthetic events: no    QCDR Measures:  ASA# 11 - Almaz-op Cardiac Arrest: ASA11B - Patient did NOT experience unanticipated cardiac arrest  ASA# 12 - Almaz-op Mortality Rate: ASA12B - Patient did NOT die  ASA# 13 - PACU Re-Intubation Rate: ASA13B - Patient did NOT require a new airway mgmt. Remains intubated from the OR  ASA# 10 - Composite Anes Safety: ASA10A - No serious adverse event    Additional Notes: remains intubated, and critically ill in the ICU. Acidosis continues to be treated.

## 2021-06-12 NOTE — PROGRESS NOTES
Gregg MONA Farfan, 70 y.o., male arrived ambulatory with walker to clinic at 1315 for IV fluids. Port was accessed using aseptic technique without difficulties with excellent blood return. Patient assessed and vital signs stable. Administered fluids per provider order. He tolerated infusion well. At completion of infusion, port was flushed with NS and Heparin then de-accessed using 2x2 and papertape. Gregg Farfan verbalized understanding of plan of care and return to clinic. Discharged to [facility] at 1450 alert and ambulatory with walker.

## 2021-06-12 NOTE — TELEPHONE ENCOUNTER
Pt's wife called clinic and left voicemail that pt is not doing well at home.  Shani states that pt has been having chills, shaky, and temp today was 100.4.  Called pt to ask follow up questions but left message for pt or Shani to call clinic back.

## 2021-06-12 NOTE — PROGRESS NOTES
Pt ambulates to infusion center for ordered hydration.  IVAD accessed, flushes easily w/good blood return noted.  Hydration given as ordered.  IVAD flushed w/NS et heparin and needle deaccessed. Pt left clinic stable to lobby.

## 2021-06-12 NOTE — ANESTHESIA PROCEDURE NOTES
Arterial Line    Start time: 10/13/2020 2:20 AM  End time: 10/13/2020 8:30 PM  Staffing:  Performing  Anesthesiologist: Marlon Gan MD  Sterile Precautions:  sterile barriers used during insertion: cap, mask, sterile gloves, large sheet, and hand hygiene used.  Arterial Line:   Immediately prior to procedure a time out was called to verify the correct patient, procedure, equipment, support staff and site/side marked as required  Laterality: left  Location: brachial  Prepped with: ChloroPrep    Needle gauge: 20 G  Number of Attempts: 2  Secured with: tape, transparent dressing and pressure dressing  Flushed with: saline  1% lidocaine local anesthesia used for skin prep.   See MAR for additional medications given.  Ultrasound evaluation of access site: yes    Concurrent real time visualization of needle entry      Additional Notes:  First attempt right radial. Unable to pass catheter. Catheter removed. Change to left brachial with success. Antibiotic disc placed. secured

## 2021-06-12 NOTE — ANESTHESIA PROCEDURE NOTES
Central line    Start time: 10/13/2020 9:15 PM  End time: 10/13/2020 9:35 PM  Patient location: OR Post-induction  Indications: vascular access  Performing Anesthesiologist: Marlon Gan MD  Pre-procedure Checklist  Completed: patient identified, site marked, risks, benefits, and alternatives discussed, timeout performed, consent obtained, hand hygiene performed, all elements of maximal sterile barriers used including cap, mask, gown, sterile gloves, and large sheet, skin prep agent completely dried prior to procedure and emergent situation    Procedure Details:  Preparation: 2% chlorhexidine  Location details: right internal jugular  Site selection rationale: ease/safety  Catheter type: Introducer with Hands-Off  Introducer type: MAC  Lumens:double lumenNumber of attempts: 1    Ultrasound evaluation of access site: yes   Sterile gel and probe cover used in ultrasound-guided central venous catheter insertion  Vessel patent by US exam  Concurrent real time visualization of needle entry  Visualized anatomic structures normal  No Pathological Findings  Manometry confirmation of venous access    Post-procedure:   line sutured and Antimicrobial disks with CHG applied  Assessment: blood return through all ports and free fluid flow  Complications: none  Comments: Ports flushed with saline, and cap placed. X ray pending. Held placement of hands free as case complete. Report given to intensivist. They can place hands free through MAC if needed.

## 2021-06-12 NOTE — PROGRESS NOTES
Gregg came to clinic this afternoon for further IV hydration.  He is having diarrhea stools with approximately 10 stools in the past 24 hours.  VSS.  Pt assessed.  Port was accessed with good blood return.  IV hydration infused over approximately 70 minutes and was well tolerated while in clinic today.  Port was heparinized then de-accessed and site covered.  He was educated on imodium ADuse and encouraged to take 1 after each loose stool but no closer together than every 2 hours and up to 8 pills per 24 hours. He verbalizes good understanding.  He was encouraged to call if diarrhea does not improve over the weekend as he may benefit from further hydration prior to return to clinic next Wednesday. Gregg left clinic ambulatory using his walker.

## 2021-06-12 NOTE — TELEPHONE ENCOUNTER
Pt's wife called clinic back stating that pt is becoming progressively weaker, confused, and this morning needed family to help get pt out of bed.  Shani is concerned about getting pt out of bed to use the rest room.  Instructed pt that pt should be evaluated in the ER today.  Instructed Shani that if she doesn't feel that she can safely transport pt to ER, she should call an ambulance.  Shani verbalized understanding and appreciative of information.

## 2021-06-12 NOTE — ANESTHESIA CARE TRANSFER NOTE
Last vitals:   Vitals:    10/13/20 2149   BP: 114/52   Pulse: (!) 110   Resp: 12   Temp: (!) 39.1  C (102.4  F)   SpO2: 100%     Patient's level of consciousness is unresponsive  Spontaneous respirations: no: ventilator  Maintains airway independently: no: ventilator  Dentition unchanged: yes  Oropharynx: oropharynx clear of all foreign objects and endotracheal tube in place    QCDR Measures:  ASA# 20 - Surgical Safety Checklist: WHO surgical safety checklist completed prior to induction    PQRS# 430 - Adult PONV Prevention: 4558F-8P - Pt did NOT receive => 2 anti-emetic agents  ASA# 8 - Peds PONV Prevention: NA - Not pediatric patient, not GA or 2 or more risk factors NOT present  PQRS# 424 - Almaz-op Temp Management: 4559F - At least one body temp DOCUMENTED => 35.5C or 95.9F within required timeframe  PQRS# 426 - PACU Transfer Protocol: - Transfer of care checklist used  ASA# 14 - Acute Post-op Pain: ASA14B - Patient did NOT experience pain >= 7 out of 10

## 2021-06-16 PROBLEM — K63.1 BOWEL PERFORATION (H): Status: ACTIVE | Noted: 2020-01-01

## 2021-06-16 PROBLEM — E86.0 DEHYDRATION: Status: ACTIVE | Noted: 2020-01-01

## 2021-06-16 PROBLEM — Z71.89 GOALS OF CARE, COUNSELING/DISCUSSION: Status: ACTIVE | Noted: 2020-01-01

## 2021-06-16 PROBLEM — Z51.5 HOSPICE CARE: Status: ACTIVE | Noted: 2020-01-01

## 2021-06-16 PROBLEM — C18.7 CANCER OF SIGMOID COLON (H): Status: ACTIVE | Noted: 2020-01-01

## 2021-06-16 PROBLEM — Z96.641 STATUS POST RIGHT HIP REPLACEMENT: Status: ACTIVE | Noted: 2018-05-21

## 2021-06-18 NOTE — ANESTHESIA CARE TRANSFER NOTE
Last vitals:   Vitals:    05/21/18 1022   BP: (!) 88/52   Pulse: 83   Resp: 16   Temp: 36.7  C (98  F)   SpO2: 98%     Patient's level of consciousness is drowsy  Spontaneous respirations: yes  Maintains airway independently: yes  Dentition unchanged: yes  Oropharynx: oropharynx clear of all foreign objects    QCDR Measures:  ASA# 20 - Surgical Safety Checklist: WHO surgical safety checklist completed prior to induction  PQRS# 430 - Adult PONV Prevention: 4558F - Pt received => 2 anti-emetic agents (different classes) preop & intraop  ASA# 8 - Peds PONV Prevention: NA - Not pediatric patient, not GA or 2 or more risk factors NOT present  PQRS# 424 - Almaz-op Temp Management: 4559F - At least one body temp DOCUMENTED => 35.5C or 95.9F within required timeframe  PQRS# 426 - PACU Transfer Protocol: - Transfer of care checklist used  ASA# 14 - Acute Post-op Pain: ASA14B - Patient did NOT experience pain >= 7 out of 10   Neosynephrine 100 mcg given for low BP.  Result of 135/65.

## 2021-06-18 NOTE — ANESTHESIA PROCEDURE NOTES
Spinal Block    Patient location during procedure: OR  Start time: 5/21/2018 8:26 AM  End time: 5/21/2018 8:31 AM  Reason for block: primary anesthetic    Staffing:  Performing  Anesthesiologist: MARIAA CARR    Preanesthetic Checklist  Completed: patient identified, risks, benefits, and alternatives discussed, timeout performed, consent obtained, airway assessed, oxygen available, suction available, emergency drugs available and hand hygiene performed  Spinal Block  Patient position: sitting  Prep: ChloraPrep and site prepped and draped  Patient monitoring: blood pressure and continuous pulse ox  Approach: midline  Location: L3-4  Injection technique: single-shot  Needle type: pencil-tip   Needle gauge: 24 G

## 2021-06-18 NOTE — ANESTHESIA PREPROCEDURE EVALUATION
Anesthesia Evaluation      Patient summary reviewed   No history of anesthetic complications     Airway   Mallampati: II  Neck ROM: full   Pulmonary - negative ROS and normal exam    breath sounds clear to auscultation                         Cardiovascular - normal exam  (+) hypertension, ,   beta blocker therapy NOT received within 24 hours of incision.,Reason: Not indicated.       Rate: normal,         Neuro/Psych - negative ROS     Endo/Other    (+) arthritis,      Comments: gout    GI/Hepatic/Renal    (+)   chronic renal disease CRI,           Dental - normal exam                        Anesthesia Plan  Planned anesthetic: spinal    ASA 2     Anesthetic plan and risks discussed with: patient and spouse    Post-op plan: routine recovery

## 2021-06-18 NOTE — ANESTHESIA POSTPROCEDURE EVALUATION
Patient: Gregg Farfan  RIGHT TOTAL HIP ARTHROPLASTY  Anesthesia type: spinal    Patient location: PACU  Last vitals:   Vitals:    05/21/18 1115   BP: (!) 153/98   Pulse: 68   Resp: 16   Temp: 36.4  C (97.6  F)   SpO2: 96%     Post vital signs: stable  Level of consciousness: awake and responds to simple questions  Post-anesthesia pain: pain controlled  Post-anesthesia nausea and vomiting: no  Pulmonary: unassisted, return to baseline  Cardiovascular: stable and blood pressure at baseline  Hydration: adequate  Anesthetic events: no    QCDR Measures:  ASA# 11 - Almaz-op Cardiac Arrest: ASA11B - Patient did NOT experience unanticipated cardiac arrest  ASA# 12 - Almaz-op Mortality Rate: ASA12B - Patient did NOT die  ASA# 13 - PACU Re-Intubation Rate: NA - No ETT / LMA used for case  ASA# 10 - Composite Anes Safety: ASA10A - No serious adverse event    Additional Notes:

## 2021-06-20 NOTE — LETTER
Letter by Delilah Burrows RN at      Author: Delilah Burrows RN Service: -- Author Type: --    Filed:  Encounter Date: 8/25/2020 Status: (Other)       Dear Gregg,     Thank you for choosing [facility] for your care.  We are committed to providing you with the highest quality and compassionate healthcare services.  The following information pertains to your first appointment with our clinic.    Date/Time of appointment: Wednesday, September 9, 2020 at 1:00 PM, arriving at 12:30 PM.   Note: Arriving 30 minutes prior to your appointment allows time to complete forms, possible labs and for your nursing assessment.     Name of your Physician: Uday Osullivan MD    What to bring to your appointment:    Completed Patient History/Initial Nursing Assessment and Medication/Allergy List (these forms were sent to you).    Any paperwork or films from your physician that we have asked you to bring.    Your current insurance card(s).    Parking:    The [facility] is located at the [place] end of [facility] in [place], [place].      After turning onto [address] from [facility], take a right turn at the first stop sign.  We have designated parking on the left, identified as parking for Cancer Care patients (Lot D).     The Code to Enter Lot D is: 0901. This code changes monthly and will always coincide with the current month followed by 01. For example, August will be 0801.  The month will continue to change but the 01 will remain constant.  If lot D is full please use Parking Lot A, directly across the street.    Please enter the Cancer Care Center on the north end of the [facility].  You will see a sign on the building.        For Medical Oncology or Hematology appointments, please take the elevator to the second floor to check in.      Also please note appointments can last 1.5-2 hours.      We hope these instructions are helpful to you.  If you have any questions or concerns, please call  us at the clinic [phone].  It is our pleasure to assist you.    Warm Regards,  Delilah Burrows RN  Nurse Navigator  [phone] (Voice Mail)

## 2021-06-29 NOTE — PROGRESS NOTES
"Progress Notes by Rosa Hanks NP at 9/14/2020 11:30 AM     Author: Rosa Hanks NP Service: Interventional Radiology Author Type: Nurse Practitioner    Filed: 9/14/2020 11:36 AM Date of Service: 9/14/2020 11:30 AM Status: Signed    : Rosa Hanks NP (Nurse Practitioner)         Interventional Radiology - Pre Procedure Note  9/14/2020    Requested Procedure:  Port placement   Requesting Provider:  Dr. Osullivan     HPI:  Gregg Farfan is a 70 y.o. old male with a recent history of metastatic colon cancer that presents for port placement for planned palliative treatment.      Patient feels significant abdominal fullness which causes him shortness of breath with activity.  At rest, he feels his breathing is generally okay.  He is due to repeat paracentesis tomorrow 9/15. Then due for chemo on Wednesday 9/16.      NPO Status:  Midnight   Anticoagulation/Antiplatelet/Bleeding tendencies:  None   Antibiotics:  Ancef dose ordered pre procedure     PAST MEDICAL HISTORY:  Reviewed    PAST SURGICAL HISTORY:  Reviewed    ALLERGIES  Patient has no known allergies.    MEDICATIONS: See MAR      EXAM:  /84   Pulse 81   Temp 99.3  F (37.4  C) (Oral)   Resp 20   Ht 5' 9\" (1.753 m)   Wt 205 lb (93 kg)   SpO2 96%   BMI 30.27 kg/m     General:  Lying in bed, abdomen very distended, in no acute distress.  Neuro:  A&O x 3.   Resp:  Rate regular, breathing non labored at rest, anterior lungs clear to auscultation bilaterally.  Cardio:  S1S2 and reg, without murmur, clicks or rubs  Abdomen: Abdomen moderate/severely distended and tense, mild tenderness.    Skin:  Upper chest clean and clear.    Motor/sensation:  No gross motor weakness.  Sensation intact.      Pre-Sedation Assessment:  Mallampati Airway Classification: Class 2: upper half of tonsil fossa visible  Previous reaction to anesthesia/sedation: no  Sedation plan based on assessment: Moderate  Sleep Apnea: no  Dentures: no  COPD: no  ASA " Classification: ASA 3 - Patient with moderate systemic disease with functional limitations  Comments: no hx of asthma       ASSESSMENT:    70 y.o. old male with a recent history of metastatic colon cancer that presents for port placement for planned palliative treatment.        PLAN:    Right chest port placement with sedation.     DNR wishes to be suspended for this procedure.      The procedure, risks and moderate sedation were discussed with patient, all questions answered and patient agrees to proceed with the procedure.  Written consent obtained.      Rosa Hanks [facility]  Interventional Radiology  [phone]

## 2021-07-03 NOTE — ADDENDUM NOTE
Addendum Note by Shante Noguera RN at 9/28/2020 11:30 AM     Author: Shante Noguera RN Service: -- Author Type: Registered Nurse    Filed: 9/28/2020 12:41 PM Encounter Date: 9/28/2020 Status: Signed    : Shante Noguera RN (Registered Nurse)    Addended by: SHANTE NOGUERA on: 9/28/2020 12:41 PM        Modules accepted: Orders

## 2021-07-03 NOTE — ADDENDUM NOTE
Addendum Note by Marlon Gan MD at 10/15/2020 10:52 AM     Author: Marlon Gan MD Service: -- Author Type: Physician    Filed: 10/15/2020 10:52 AM Date of Service: 10/15/2020 10:52 AM Status: Signed    : Marlon Gan MD (Physician)       Addendum  created 10/15/20 1052 by Marlon Gan MD    Intraprocedure Event edited